# Patient Record
Sex: FEMALE | Race: WHITE | NOT HISPANIC OR LATINO | Employment: OTHER | URBAN - METROPOLITAN AREA
[De-identification: names, ages, dates, MRNs, and addresses within clinical notes are randomized per-mention and may not be internally consistent; named-entity substitution may affect disease eponyms.]

---

## 2018-05-31 ENCOUNTER — APPOINTMENT (OUTPATIENT)
Dept: CT IMAGING | Facility: HOSPITAL | Age: 63
End: 2018-05-31

## 2018-05-31 ENCOUNTER — APPOINTMENT (OUTPATIENT)
Dept: MRI IMAGING | Facility: HOSPITAL | Age: 63
End: 2018-05-31

## 2018-05-31 ENCOUNTER — HOSPITAL ENCOUNTER (EMERGENCY)
Facility: HOSPITAL | Age: 63
Discharge: HOME | End: 2018-05-31
Attending: EMERGENCY MEDICINE | Admitting: EMERGENCY MEDICINE

## 2018-05-31 VITALS
BODY MASS INDEX: 31.52 KG/M2 | DIASTOLIC BLOOD PRESSURE: 94 MMHG | RESPIRATION RATE: 16 BRPM | OXYGEN SATURATION: 99 % | SYSTOLIC BLOOD PRESSURE: 176 MMHG | HEIGHT: 63 IN | WEIGHT: 177.91 LBS | HEART RATE: 60 BPM | TEMPERATURE: 98.6 F

## 2018-05-31 DIAGNOSIS — R29.810 FACIAL WEAKNESS: Primary | ICD-10-CM

## 2018-05-31 LAB
ALBUMIN SERPL-MCNC: 4.9 G/DL (ref 3.5–5.2)
ALBUMIN/GLOB SERPL: 1.5 G/DL
ALP SERPL-CCNC: 71 U/L (ref 39–117)
ALT SERPL W P-5'-P-CCNC: 14 U/L (ref 1–33)
ANION GAP SERPL CALCULATED.3IONS-SCNC: 14.4 MMOL/L
AST SERPL-CCNC: 14 U/L (ref 1–32)
BASOPHILS # BLD AUTO: 0.05 10*3/MM3 (ref 0–0.2)
BASOPHILS NFR BLD AUTO: 0.8 % (ref 0–1.5)
BILIRUB SERPL-MCNC: 0.3 MG/DL (ref 0.1–1.2)
BUN BLD-MCNC: 9 MG/DL (ref 8–23)
BUN/CREAT SERPL: 11.8 (ref 7–25)
CALCIUM SPEC-SCNC: 9.8 MG/DL (ref 8.6–10.5)
CHLORIDE SERPL-SCNC: 103 MMOL/L (ref 98–107)
CO2 SERPL-SCNC: 25.6 MMOL/L (ref 22–29)
CREAT BLD-MCNC: 0.76 MG/DL (ref 0.57–1)
DEPRECATED RDW RBC AUTO: 41.7 FL (ref 37–54)
EOSINOPHIL # BLD AUTO: 0.05 10*3/MM3 (ref 0–0.7)
EOSINOPHIL NFR BLD AUTO: 0.8 % (ref 0.3–6.2)
ERYTHROCYTE [DISTWIDTH] IN BLOOD BY AUTOMATED COUNT: 13.7 % (ref 11.7–13)
GFR SERPL CREATININE-BSD FRML MDRD: 78 ML/MIN/1.73
GLOBULIN UR ELPH-MCNC: 3.2 GM/DL
GLUCOSE BLD-MCNC: 93 MG/DL (ref 65–99)
HCT VFR BLD AUTO: 36.6 % (ref 35.6–45.5)
HGB BLD-MCNC: 11.4 G/DL (ref 11.9–15.5)
IMM GRANULOCYTES # BLD: 0.01 10*3/MM3 (ref 0–0.03)
IMM GRANULOCYTES NFR BLD: 0.2 % (ref 0–0.5)
INR PPP: 1.07 (ref 0.9–1.1)
LYMPHOCYTES # BLD AUTO: 1.73 10*3/MM3 (ref 0.9–4.8)
LYMPHOCYTES NFR BLD AUTO: 27.3 % (ref 19.6–45.3)
MCH RBC QN AUTO: 25.7 PG (ref 26.9–32)
MCHC RBC AUTO-ENTMCNC: 31.1 G/DL (ref 32.4–36.3)
MCV RBC AUTO: 82.6 FL (ref 80.5–98.2)
MONOCYTES # BLD AUTO: 0.41 10*3/MM3 (ref 0.2–1.2)
MONOCYTES NFR BLD AUTO: 6.5 % (ref 5–12)
NEUTROPHILS # BLD AUTO: 4.09 10*3/MM3 (ref 1.9–8.1)
NEUTROPHILS NFR BLD AUTO: 64.6 % (ref 42.7–76)
PLATELET # BLD AUTO: 406 10*3/MM3 (ref 140–500)
PMV BLD AUTO: 10.1 FL (ref 6–12)
POTASSIUM BLD-SCNC: 4.6 MMOL/L (ref 3.5–5.2)
PROT SERPL-MCNC: 8.1 G/DL (ref 6–8.5)
PROTHROMBIN TIME: 13.7 SECONDS (ref 11.7–14.2)
RBC # BLD AUTO: 4.43 10*6/MM3 (ref 3.9–5.2)
SODIUM BLD-SCNC: 143 MMOL/L (ref 136–145)
WBC NRBC COR # BLD: 6.33 10*3/MM3 (ref 4.5–10.7)

## 2018-05-31 PROCEDURE — 70553 MRI BRAIN STEM W/O & W/DYE: CPT

## 2018-05-31 PROCEDURE — A9577 INJ MULTIHANCE: HCPCS | Performed by: EMERGENCY MEDICINE

## 2018-05-31 PROCEDURE — 85025 COMPLETE CBC W/AUTO DIFF WBC: CPT | Performed by: EMERGENCY MEDICINE

## 2018-05-31 PROCEDURE — 85610 PROTHROMBIN TIME: CPT | Performed by: EMERGENCY MEDICINE

## 2018-05-31 PROCEDURE — 0 GADOBENATE DIMEGLUMINE 529 MG/ML SOLUTION: Performed by: EMERGENCY MEDICINE

## 2018-05-31 PROCEDURE — 80053 COMPREHEN METABOLIC PANEL: CPT | Performed by: EMERGENCY MEDICINE

## 2018-05-31 PROCEDURE — 70450 CT HEAD/BRAIN W/O DYE: CPT

## 2018-05-31 PROCEDURE — 93005 ELECTROCARDIOGRAM TRACING: CPT | Performed by: EMERGENCY MEDICINE

## 2018-05-31 PROCEDURE — 99285 EMERGENCY DEPT VISIT HI MDM: CPT

## 2018-05-31 PROCEDURE — 93010 ELECTROCARDIOGRAM REPORT: CPT | Performed by: INTERNAL MEDICINE

## 2018-05-31 RX ORDER — ACETAMINOPHEN 500 MG
1000 TABLET ORAL EVERY 6 HOURS PRN
COMMUNITY

## 2018-05-31 RX ORDER — SIMVASTATIN 20 MG
20 TABLET ORAL DAILY
COMMUNITY

## 2018-05-31 RX ORDER — SODIUM CHLORIDE 0.9 % (FLUSH) 0.9 %
10 SYRINGE (ML) INJECTION AS NEEDED
Status: DISCONTINUED | OUTPATIENT
Start: 2018-05-31 | End: 2018-05-31 | Stop reason: HOSPADM

## 2018-05-31 RX ORDER — ASPIRIN 81 MG/1
81 TABLET ORAL DAILY
COMMUNITY

## 2018-05-31 RX ORDER — LISINOPRIL 20 MG/1
20 TABLET ORAL DAILY
COMMUNITY
End: 2019-03-13 | Stop reason: HOSPADM

## 2018-05-31 RX ORDER — LABETALOL 300 MG/1
300 TABLET, FILM COATED ORAL 2 TIMES DAILY
COMMUNITY

## 2018-05-31 RX ADMIN — GADOBENATE DIMEGLUMINE 16 ML: 529 INJECTION, SOLUTION INTRAVENOUS at 18:42

## 2018-06-01 ENCOUNTER — TELEPHONE (OUTPATIENT)
Dept: NEUROSURGERY | Facility: CLINIC | Age: 63
End: 2018-06-01

## 2018-06-01 NOTE — TELEPHONE ENCOUNTER
Celestino called to schedule an appointment due to er recommend them call & schedule an appointment.  Let the patient know that we will have Dr Georges look at the er note and mri and will call back to let them know if we will be scheduling or not.

## 2018-06-01 NOTE — TELEPHONE ENCOUNTER
We did not consult on this patient but the discharge summary states that she needs to follow up with you asap. Does this patient need to be worked in?

## 2018-06-02 NOTE — TELEPHONE ENCOUNTER
She needs to be seen by the neurosurgeon who clipped and treated her aneurysm. It was not me. May have been Hodes or Ab. See if you can find out and direct the patient to that doctor.

## 2018-06-04 NOTE — TELEPHONE ENCOUNTER
Spoke with the patient to let her know that per Dr. Georges she needs to see the neurosurgeon that previously treated her aneurysm.

## 2019-03-01 ENCOUNTER — APPOINTMENT (OUTPATIENT)
Dept: GENERAL RADIOLOGY | Facility: HOSPITAL | Age: 64
End: 2019-03-01

## 2019-03-01 ENCOUNTER — HOSPITAL ENCOUNTER (EMERGENCY)
Facility: HOSPITAL | Age: 64
Discharge: HOME | End: 2019-03-01
Attending: EMERGENCY MEDICINE | Admitting: EMERGENCY MEDICINE

## 2019-03-01 VITALS
HEIGHT: 63 IN | RESPIRATION RATE: 16 BRPM | WEIGHT: 185 LBS | BODY MASS INDEX: 32.78 KG/M2 | HEART RATE: 89 BPM | OXYGEN SATURATION: 98 % | TEMPERATURE: 98.2 F | DIASTOLIC BLOOD PRESSURE: 72 MMHG | SYSTOLIC BLOOD PRESSURE: 113 MMHG

## 2019-03-01 DIAGNOSIS — S43.402A SPRAIN OF LEFT SHOULDER, UNSPECIFIED SHOULDER SPRAIN TYPE, INITIAL ENCOUNTER: Primary | ICD-10-CM

## 2019-03-01 PROCEDURE — 73030 X-RAY EXAM OF SHOULDER: CPT

## 2019-03-01 PROCEDURE — 99283 EMERGENCY DEPT VISIT LOW MDM: CPT

## 2019-03-01 RX ORDER — NAPROXEN 500 MG/1
500 TABLET ORAL 2 TIMES DAILY WITH MEALS
Qty: 30 TABLET | Refills: 0 | Status: SHIPPED | OUTPATIENT
Start: 2019-03-01 | End: 2019-03-11

## 2019-03-01 RX ORDER — IBUPROFEN 800 MG/1
800 TABLET ORAL ONCE
Status: COMPLETED | OUTPATIENT
Start: 2019-03-01 | End: 2019-03-01

## 2019-03-01 RX ADMIN — IBUPROFEN 800 MG: 800 TABLET, FILM COATED ORAL at 13:22

## 2019-03-01 NOTE — ED NOTES
Pt reports she helped a friend move some boxes x3-4 days ago, c/o left shoulder and arm pain.      Skyla Payan, RN  03/01/19 0196

## 2019-03-01 NOTE — ED PROVIDER NOTES
EMERGENCY DEPARTMENT ENCOUNTER    CHIEF COMPLAINT  Chief Complaint: shoulder pain  History given by: patient  History limited by: nothing  Room Number: 01/01  PMD: Dorie Metz MD      HPI:  Pt is a 63 y.o. female who presents complaining of left shoulder pain that began four days ago. Her pain is worse when she moves it and is alleviated with positional rest. Pt denies injury to the area but she did help a friend move about one week ago and was lifting several boxes. She denies fever, CP, and SOA. She has no other complaints at this time.     Duration/Onset/Timing: four days, gradual, constant  Location: left shoulder  Radiation: none  Quality: pain  Intensity/Severity: moderate   Associated Symptoms: none  Aggravating or Alleviating Factors: movement and positional rest  Previous Episodes: none      PAST MEDICAL HISTORY  Active Ambulatory Problems     Diagnosis Date Noted   • No Active Ambulatory Problems     Resolved Ambulatory Problems     Diagnosis Date Noted   • No Resolved Ambulatory Problems     Past Medical History:   Diagnosis Date   • Aneurysm (CMS/HCC)    • Hyperlipidemia    • Hypertension        PAST SURGICAL HISTORY  Past Surgical History:   Procedure Laterality Date   • IR CEREBRAL ANEURYSM COILING         FAMILY HISTORY  No family history on file.    SOCIAL HISTORY  Social History     Socioeconomic History   • Marital status: Legally      Spouse name: Not on file   • Number of children: Not on file   • Years of education: Not on file   • Highest education level: Not on file   Social Needs   • Financial resource strain: Not on file   • Food insecurity - worry: Not on file   • Food insecurity - inability: Not on file   • Transportation needs - medical: Not on file   • Transportation needs - non-medical: Not on file   Occupational History   • Not on file   Tobacco Use   • Smoking status: Former Smoker   Substance and Sexual Activity   • Alcohol use: No   • Drug use: No   • Sexual activity:  Defer   Other Topics Concern   • Not on file   Social History Narrative   • Not on file       ALLERGIES  Sulfa antibiotics    REVIEW OF SYSTEMS  Review of Systems   Constitutional: Negative for fever.   Eyes: Positive for redness.   Respiratory: Negative for shortness of breath.    Cardiovascular: Negative for chest pain.   Musculoskeletal: Positive for arthralgias (left shoulder).   All other systems reviewed and are negative.      PHYSICAL EXAM  ED Triage Vitals [03/01/19 1231]   Temp Heart Rate Resp BP SpO2   98.2 °F (36.8 °C) 89 16 -- 98 %      Temp src Heart Rate Source Patient Position BP Location FiO2 (%)   Tympanic -- -- -- --       Physical Exam   Constitutional: No distress.   HENT:   Head: Normocephalic and atraumatic.   Cardiovascular: Regular rhythm.   Pulmonary/Chest: No respiratory distress.   Abdominal: There is no tenderness.   Musculoskeletal: She exhibits no tenderness.   C-spine unremarkable. Tenderness to palpation over left posterior shoulder. Pt has good range of motion of her left shoulder.   Skin: No rash noted.   Nursing note and vitals reviewed.        RADIOLOGY  XR Shoulder 2+ View Left   Final Result       No acute fracture is identified. Degenerative changes. If further   imaging evaluation is indicated, MRI could be considered.               This report was finalized on 3/1/2019 2:06 PM by Dr. Siva Israel M.D.               I ordered the above noted radiological studies. Interpreted by radiologist. Reviewed by me in PACS.       PROCEDURES  Procedures      PROGRESS AND CONSULTS       1303  Ordered XR L shoulder for further evaluation.     1320  Ordered ibuprofen for pain.    1412  Rechecked Pt who is resting comfortably. Informed her that her XR left shoulder shows nothing acute. Discussed plans to discharge Pt with naproxen for pain and a sling for comfort. Pt understands and agrees to all plans. All questions answered.     MEDICAL DECISION MAKING  Results were  reviewed/discussed with the patient and they were also made aware of online access. Pt also made aware that some labs, such as cultures, will not be resulted during ER visit and follow up with PMD is necessary.     MDM  Number of Diagnoses or Management Options     Amount and/or Complexity of Data Reviewed  Tests in the radiology section of CPT®: reviewed and ordered (XR left shoulder shows nothing acute.)           DIAGNOSIS  Final diagnoses:   Sprain of left shoulder, unspecified shoulder sprain type, initial encounter       DISPOSITION  DISCHARGE    Patient discharged in stable condition.    Reviewed implications of results, diagnosis, meds, responsibility to follow up, warning signs and symptoms of possible worsening, potential complications and reasons to return to ER, including new or worsening symptoms.    Patient/Family voiced understanding of above instructions.    Discussed plan for discharge, as there is no emergent indication for admission. Patient referred to primary care provider for BP management due to today's BP. Pt/family is agreeable and understands need for follow up and repeat testing.  Pt is aware that discharge does not mean that nothing is wrong but it indicates no emergency is present that requires admission and they must continue care with follow-up as given below or physician of their choice.     FOLLOW-UP  Jb Graham MD  [address] [facility] [address]  [place] [address]  [phone]    In 1 week  Call for Appointment         Medication List      New Prescriptions    naproxen 500 MG tablet  Commonly known as:  NAPROSYN  Take 1 tablet by mouth 2 (Two) Times a Day With Meals.              Latest Documented Vital Signs:  As of 2:15 PM  BP- 113/72 HR- 89 Temp- 98.2 °F (36.8 °C) (Tympanic) O2 sat- 98%    --  Documentation assistance provided by akash Salinas for Dr. Reynolds.  Information recorded by the akash was done at my direction and has been verified and validated by  me.         Dorie Salinas  03/01/19 1411       Tariq Reynolds MD  03/01/19 1419

## 2019-03-11 ENCOUNTER — HOSPITAL ENCOUNTER (OUTPATIENT)
Facility: HOSPITAL | Age: 64
Discharge: HOME | End: 2019-03-13
Attending: EMERGENCY MEDICINE | Admitting: INTERNAL MEDICINE

## 2019-03-11 ENCOUNTER — APPOINTMENT (OUTPATIENT)
Dept: GENERAL RADIOLOGY | Facility: HOSPITAL | Age: 64
End: 2019-03-11

## 2019-03-11 ENCOUNTER — APPOINTMENT (OUTPATIENT)
Dept: CT IMAGING | Facility: HOSPITAL | Age: 64
End: 2019-03-11

## 2019-03-11 DIAGNOSIS — M25.512 ACUTE PAIN OF LEFT SHOULDER: ICD-10-CM

## 2019-03-11 DIAGNOSIS — R55 SYNCOPE AND COLLAPSE: Primary | ICD-10-CM

## 2019-03-11 DIAGNOSIS — D50.9 IRON DEFICIENCY ANEMIA, UNSPECIFIED IRON DEFICIENCY ANEMIA TYPE: ICD-10-CM

## 2019-03-11 DIAGNOSIS — D72.829 LEUKOCYTOSIS, UNSPECIFIED TYPE: ICD-10-CM

## 2019-03-11 PROBLEM — I72.9 ANEURYSM (HCC): Status: ACTIVE | Noted: 2019-03-11

## 2019-03-11 PROBLEM — I10 ESSENTIAL HYPERTENSION: Status: ACTIVE | Noted: 2019-03-11

## 2019-03-11 LAB
ALBUMIN SERPL-MCNC: 4.8 G/DL (ref 3.5–5.2)
ALBUMIN/GLOB SERPL: 1.4 G/DL
ALP SERPL-CCNC: 58 U/L (ref 39–117)
ALT SERPL W P-5'-P-CCNC: 18 U/L (ref 1–33)
ANION GAP SERPL CALCULATED.3IONS-SCNC: 14.7 MMOL/L
AST SERPL-CCNC: 10 U/L (ref 1–32)
BACTERIA UR QL AUTO: ABNORMAL /HPF
BASOPHILS # BLD AUTO: 0.03 10*3/MM3 (ref 0–0.2)
BASOPHILS NFR BLD AUTO: 0.2 % (ref 0–1.5)
BILIRUB SERPL-MCNC: 0.3 MG/DL (ref 0.1–1.2)
BILIRUB UR QL STRIP: NEGATIVE
BUN BLD-MCNC: 21 MG/DL (ref 8–23)
BUN/CREAT SERPL: 16.4 (ref 7–25)
CALCIUM SPEC-SCNC: 10 MG/DL (ref 8.6–10.5)
CHLORIDE SERPL-SCNC: 99 MMOL/L (ref 98–107)
CLARITY UR: ABNORMAL
CO2 SERPL-SCNC: 23.3 MMOL/L (ref 22–29)
COD CRY URNS QL: ABNORMAL /HPF
COLOR UR: ABNORMAL
CREAT BLD-MCNC: 1.28 MG/DL (ref 0.57–1)
CRP SERPL-MCNC: 1.41 MG/DL (ref 0–0.5)
D-LACTATE SERPL-SCNC: 1.3 MMOL/L (ref 0.5–2)
DEPRECATED RDW RBC AUTO: 44.7 FL (ref 37–54)
EOSINOPHIL # BLD AUTO: 0 10*3/MM3 (ref 0–0.4)
EOSINOPHIL NFR BLD AUTO: 0 % (ref 0.3–6.2)
ERYTHROCYTE [DISTWIDTH] IN BLOOD BY AUTOMATED COUNT: 15.9 % (ref 12.3–15.4)
ERYTHROCYTE [SEDIMENTATION RATE] IN BLOOD: 62 MM/HR (ref 0–30)
GFR SERPL CREATININE-BSD FRML MDRD: 42 ML/MIN/1.73
GLOBULIN UR ELPH-MCNC: 3.5 GM/DL
GLUCOSE BLD-MCNC: 108 MG/DL (ref 65–99)
GLUCOSE UR STRIP-MCNC: NEGATIVE MG/DL
HCT VFR BLD AUTO: 33.1 % (ref 34–46.6)
HGB BLD-MCNC: 9.7 G/DL (ref 12–15.9)
HGB UR QL STRIP.AUTO: NEGATIVE
HOLD SPECIMEN: NORMAL
HOLD SPECIMEN: NORMAL
HYALINE CASTS UR QL AUTO: ABNORMAL /LPF
IMM GRANULOCYTES # BLD AUTO: 0.15 10*3/MM3 (ref 0–0.05)
IMM GRANULOCYTES NFR BLD AUTO: 0.8 % (ref 0–0.5)
KETONES UR QL STRIP: ABNORMAL
LEUKOCYTE ESTERASE UR QL STRIP.AUTO: ABNORMAL
LYMPHOCYTES # BLD AUTO: 1.7 10*3/MM3 (ref 0.7–3.1)
LYMPHOCYTES NFR BLD AUTO: 8.9 % (ref 19.6–45.3)
MAGNESIUM SERPL-MCNC: 2.4 MG/DL (ref 1.6–2.4)
MCH RBC QN AUTO: 23 PG (ref 26.6–33)
MCHC RBC AUTO-ENTMCNC: 29.3 G/DL (ref 31.5–35.7)
MCV RBC AUTO: 78.4 FL (ref 79–97)
MONOCYTES # BLD AUTO: 1.63 10*3/MM3 (ref 0.1–0.9)
MONOCYTES NFR BLD AUTO: 8.5 % (ref 5–12)
NEUTROPHILS # BLD AUTO: 15.68 10*3/MM3 (ref 1.4–7)
NEUTROPHILS NFR BLD AUTO: 81.6 % (ref 42.7–76)
NITRITE UR QL STRIP: NEGATIVE
NRBC BLD AUTO-RTO: 0 /100 WBC (ref 0–0)
PH UR STRIP.AUTO: <=5 [PH] (ref 5–8)
PLATELET # BLD AUTO: 494 10*3/MM3 (ref 140–450)
PMV BLD AUTO: 9.5 FL (ref 6–12)
POTASSIUM BLD-SCNC: 4.7 MMOL/L (ref 3.5–5.2)
PROCALCITONIN SERPL-MCNC: 0.06 NG/ML (ref 0.1–0.25)
PROT SERPL-MCNC: 8.3 G/DL (ref 6–8.5)
PROT UR QL STRIP: ABNORMAL
RBC # BLD AUTO: 4.22 10*6/MM3 (ref 3.77–5.28)
RBC # UR: ABNORMAL /HPF
REF LAB TEST METHOD: ABNORMAL
SODIUM BLD-SCNC: 137 MMOL/L (ref 136–145)
SP GR UR STRIP: 1.02 (ref 1–1.03)
SQUAMOUS #/AREA URNS HPF: ABNORMAL /HPF
TROPONIN T SERPL-MCNC: <0.01 NG/ML (ref 0–0.03)
UROBILINOGEN UR QL STRIP: ABNORMAL
WBC NRBC COR # BLD: 19.19 10*3/MM3 (ref 3.4–10.8)
WBC UR QL AUTO: ABNORMAL /HPF
WHOLE BLOOD HOLD SPECIMEN: NORMAL
WHOLE BLOOD HOLD SPECIMEN: NORMAL

## 2019-03-11 PROCEDURE — 83605 ASSAY OF LACTIC ACID: CPT | Performed by: NURSE PRACTITIONER

## 2019-03-11 PROCEDURE — 70450 CT HEAD/BRAIN W/O DYE: CPT

## 2019-03-11 PROCEDURE — G0378 HOSPITAL OBSERVATION PER HR: HCPCS

## 2019-03-11 PROCEDURE — 87040 BLOOD CULTURE FOR BACTERIA: CPT | Performed by: NURSE PRACTITIONER

## 2019-03-11 PROCEDURE — 73030 X-RAY EXAM OF SHOULDER: CPT

## 2019-03-11 PROCEDURE — 87086 URINE CULTURE/COLONY COUNT: CPT | Performed by: NURSE PRACTITIONER

## 2019-03-11 PROCEDURE — 85025 COMPLETE CBC W/AUTO DIFF WBC: CPT

## 2019-03-11 PROCEDURE — 86140 C-REACTIVE PROTEIN: CPT | Performed by: NURSE PRACTITIONER

## 2019-03-11 PROCEDURE — 84484 ASSAY OF TROPONIN QUANT: CPT

## 2019-03-11 PROCEDURE — 25010000002 MORPHINE PER 10 MG: Performed by: NURSE PRACTITIONER

## 2019-03-11 PROCEDURE — 96361 HYDRATE IV INFUSION ADD-ON: CPT

## 2019-03-11 PROCEDURE — 85652 RBC SED RATE AUTOMATED: CPT | Performed by: NURSE PRACTITIONER

## 2019-03-11 PROCEDURE — 84145 PROCALCITONIN (PCT): CPT | Performed by: NURSE PRACTITIONER

## 2019-03-11 PROCEDURE — 93005 ELECTROCARDIOGRAM TRACING: CPT | Performed by: EMERGENCY MEDICINE

## 2019-03-11 PROCEDURE — 93005 ELECTROCARDIOGRAM TRACING: CPT

## 2019-03-11 PROCEDURE — 96375 TX/PRO/DX INJ NEW DRUG ADDON: CPT

## 2019-03-11 PROCEDURE — 87076 CULTURE ANAEROBE IDENT EACH: CPT | Performed by: NURSE PRACTITIONER

## 2019-03-11 PROCEDURE — 83735 ASSAY OF MAGNESIUM: CPT

## 2019-03-11 PROCEDURE — 93010 ELECTROCARDIOGRAM REPORT: CPT | Performed by: INTERNAL MEDICINE

## 2019-03-11 PROCEDURE — 71046 X-RAY EXAM CHEST 2 VIEWS: CPT

## 2019-03-11 PROCEDURE — 96374 THER/PROPH/DIAG INJ IV PUSH: CPT

## 2019-03-11 PROCEDURE — 25010000002 ONDANSETRON PER 1 MG: Performed by: NURSE PRACTITIONER

## 2019-03-11 PROCEDURE — 99285 EMERGENCY DEPT VISIT HI MDM: CPT

## 2019-03-11 PROCEDURE — 81001 URINALYSIS AUTO W/SCOPE: CPT | Performed by: NURSE PRACTITIONER

## 2019-03-11 PROCEDURE — 80053 COMPREHEN METABOLIC PANEL: CPT

## 2019-03-11 PROCEDURE — 87077 CULTURE AEROBIC IDENTIFY: CPT | Performed by: NURSE PRACTITIONER

## 2019-03-11 PROCEDURE — 87186 SC STD MICRODIL/AGAR DIL: CPT | Performed by: NURSE PRACTITIONER

## 2019-03-11 RX ORDER — ONDANSETRON 4 MG/1
4 TABLET, FILM COATED ORAL EVERY 6 HOURS PRN
Status: DISCONTINUED | OUTPATIENT
Start: 2019-03-11 | End: 2019-03-13 | Stop reason: HOSPADM

## 2019-03-11 RX ORDER — ONDANSETRON 4 MG/1
4 TABLET, ORALLY DISINTEGRATING ORAL EVERY 6 HOURS PRN
Status: DISCONTINUED | OUTPATIENT
Start: 2019-03-11 | End: 2019-03-13 | Stop reason: HOSPADM

## 2019-03-11 RX ORDER — ASPIRIN 81 MG/1
81 TABLET ORAL DAILY
Status: DISCONTINUED | OUTPATIENT
Start: 2019-03-12 | End: 2019-03-13 | Stop reason: HOSPADM

## 2019-03-11 RX ORDER — MORPHINE SULFATE 2 MG/ML
2 INJECTION, SOLUTION INTRAMUSCULAR; INTRAVENOUS ONCE
Status: COMPLETED | OUTPATIENT
Start: 2019-03-11 | End: 2019-03-11

## 2019-03-11 RX ORDER — ONDANSETRON 2 MG/ML
4 INJECTION INTRAMUSCULAR; INTRAVENOUS EVERY 6 HOURS PRN
Status: DISCONTINUED | OUTPATIENT
Start: 2019-03-11 | End: 2019-03-13 | Stop reason: HOSPADM

## 2019-03-11 RX ORDER — NITROGLYCERIN 0.4 MG/1
0.4 TABLET SUBLINGUAL
Status: DISCONTINUED | OUTPATIENT
Start: 2019-03-11 | End: 2019-03-12

## 2019-03-11 RX ORDER — NALOXONE HYDROCHLORIDE 1 MG/ML
0.4 INJECTION INTRAMUSCULAR; INTRAVENOUS; SUBCUTANEOUS AS NEEDED
Status: DISCONTINUED | OUTPATIENT
Start: 2019-03-11 | End: 2019-03-12

## 2019-03-11 RX ORDER — SODIUM CHLORIDE 0.9 % (FLUSH) 0.9 %
10 SYRINGE (ML) INJECTION AS NEEDED
Status: DISCONTINUED | OUTPATIENT
Start: 2019-03-11 | End: 2019-03-12

## 2019-03-11 RX ORDER — SODIUM CHLORIDE 0.9 % (FLUSH) 0.9 %
3 SYRINGE (ML) INJECTION EVERY 12 HOURS SCHEDULED
Status: DISCONTINUED | OUTPATIENT
Start: 2019-03-11 | End: 2019-03-13 | Stop reason: HOSPADM

## 2019-03-11 RX ORDER — ONDANSETRON 2 MG/ML
4 INJECTION INTRAMUSCULAR; INTRAVENOUS ONCE
Status: COMPLETED | OUTPATIENT
Start: 2019-03-11 | End: 2019-03-11

## 2019-03-11 RX ORDER — ATORVASTATIN CALCIUM 10 MG/1
10 TABLET, FILM COATED ORAL DAILY
Status: DISCONTINUED | OUTPATIENT
Start: 2019-03-12 | End: 2019-03-13 | Stop reason: HOSPADM

## 2019-03-11 RX ORDER — ACETAMINOPHEN 325 MG/1
650 TABLET ORAL EVERY 4 HOURS PRN
Status: DISCONTINUED | OUTPATIENT
Start: 2019-03-11 | End: 2019-03-13 | Stop reason: HOSPADM

## 2019-03-11 RX ORDER — SODIUM CHLORIDE 0.9 % (FLUSH) 0.9 %
3-10 SYRINGE (ML) INJECTION AS NEEDED
Status: DISCONTINUED | OUTPATIENT
Start: 2019-03-11 | End: 2019-03-13 | Stop reason: HOSPADM

## 2019-03-11 RX ORDER — SENNA AND DOCUSATE SODIUM 50; 8.6 MG/1; MG/1
2 TABLET, FILM COATED ORAL 2 TIMES DAILY PRN
Status: DISCONTINUED | OUTPATIENT
Start: 2019-03-11 | End: 2019-03-13 | Stop reason: HOSPADM

## 2019-03-11 RX ORDER — HYDROCODONE BITARTRATE AND ACETAMINOPHEN 5; 325 MG/1; MG/1
1 TABLET ORAL EVERY 4 HOURS PRN
Status: DISCONTINUED | OUTPATIENT
Start: 2019-03-11 | End: 2019-03-13 | Stop reason: HOSPADM

## 2019-03-11 RX ORDER — SODIUM CHLORIDE 9 MG/ML
100 INJECTION, SOLUTION INTRAVENOUS CONTINUOUS
Status: DISCONTINUED | OUTPATIENT
Start: 2019-03-11 | End: 2019-03-12

## 2019-03-11 RX ORDER — HYDROMORPHONE HYDROCHLORIDE 1 MG/ML
0.5 INJECTION, SOLUTION INTRAMUSCULAR; INTRAVENOUS; SUBCUTANEOUS
Status: DISCONTINUED | OUTPATIENT
Start: 2019-03-11 | End: 2019-03-12

## 2019-03-11 RX ADMIN — MORPHINE SULFATE 2 MG: 2 INJECTION, SOLUTION INTRAMUSCULAR; INTRAVENOUS at 20:36

## 2019-03-11 RX ADMIN — HYDROCODONE BITARTRATE AND ACETAMINOPHEN 1 TABLET: 5; 325 TABLET ORAL at 22:21

## 2019-03-11 RX ADMIN — SODIUM CHLORIDE 100 ML/HR: 9 INJECTION, SOLUTION INTRAVENOUS at 22:21

## 2019-03-11 RX ADMIN — SODIUM CHLORIDE 1000 ML: 9 INJECTION, SOLUTION INTRAVENOUS at 19:13

## 2019-03-11 RX ADMIN — ONDANSETRON HYDROCHLORIDE 4 MG: 2 SOLUTION INTRAMUSCULAR; INTRAVENOUS at 20:30

## 2019-03-12 ENCOUNTER — APPOINTMENT (OUTPATIENT)
Dept: MRI IMAGING | Facility: HOSPITAL | Age: 64
End: 2019-03-12

## 2019-03-12 PROBLEM — N17.9 ACUTE KIDNEY INJURY (HCC): Status: ACTIVE | Noted: 2019-03-12

## 2019-03-12 PROBLEM — D50.9 IRON DEFICIENCY ANEMIA: Status: ACTIVE | Noted: 2019-03-12

## 2019-03-12 LAB
ALBUMIN SERPL-MCNC: 4.1 G/DL (ref 3.5–5.2)
ALBUMIN/GLOB SERPL: 1.2 G/DL
ALP SERPL-CCNC: 53 U/L (ref 39–117)
ALT SERPL W P-5'-P-CCNC: 14 U/L (ref 1–33)
ANION GAP SERPL CALCULATED.3IONS-SCNC: 13.1 MMOL/L
AST SERPL-CCNC: 10 U/L (ref 1–32)
BASOPHILS # BLD AUTO: 0.03 10*3/MM3 (ref 0–0.2)
BASOPHILS NFR BLD AUTO: 0.2 % (ref 0–1.5)
BILIRUB SERPL-MCNC: 0.2 MG/DL (ref 0.1–1.2)
BUN BLD-MCNC: 18 MG/DL (ref 8–23)
BUN/CREAT SERPL: 22.5 (ref 7–25)
CALCIUM SPEC-SCNC: 9.9 MG/DL (ref 8.6–10.5)
CHLORIDE SERPL-SCNC: 105 MMOL/L (ref 98–107)
CO2 SERPL-SCNC: 23.9 MMOL/L (ref 22–29)
CREAT BLD-MCNC: 0.8 MG/DL (ref 0.57–1)
DEPRECATED RDW RBC AUTO: 45.5 FL (ref 37–54)
EOSINOPHIL # BLD AUTO: 0.01 10*3/MM3 (ref 0–0.4)
EOSINOPHIL NFR BLD AUTO: 0.1 % (ref 0.3–6.2)
ERYTHROCYTE [DISTWIDTH] IN BLOOD BY AUTOMATED COUNT: 16 % (ref 12.3–15.4)
FERRITIN SERPL-MCNC: 10.4 NG/ML (ref 13–150)
FOLATE SERPL-MCNC: 13.4 NG/ML (ref 4.78–24.2)
GFR SERPL CREATININE-BSD FRML MDRD: 73 ML/MIN/1.73
GLOBULIN UR ELPH-MCNC: 3.3 GM/DL
GLUCOSE BLD-MCNC: 105 MG/DL (ref 65–99)
HCT VFR BLD AUTO: 29.2 % (ref 34–46.6)
HEMOCCULT STL QL: NEGATIVE
HGB BLD-MCNC: 8.4 G/DL (ref 12–15.9)
IMM GRANULOCYTES # BLD AUTO: 0.06 10*3/MM3 (ref 0–0.05)
IMM GRANULOCYTES NFR BLD AUTO: 0.4 % (ref 0–0.5)
IRON 24H UR-MRATE: 11 MCG/DL (ref 37–145)
IRON SATN MFR SERPL: 2 % (ref 20–50)
LYMPHOCYTES # BLD AUTO: 1.53 10*3/MM3 (ref 0.7–3.1)
LYMPHOCYTES NFR BLD AUTO: 11.4 % (ref 19.6–45.3)
MCH RBC QN AUTO: 22.5 PG (ref 26.6–33)
MCHC RBC AUTO-ENTMCNC: 28.8 G/DL (ref 31.5–35.7)
MCV RBC AUTO: 78.1 FL (ref 79–97)
MONOCYTES # BLD AUTO: 1.13 10*3/MM3 (ref 0.1–0.9)
MONOCYTES NFR BLD AUTO: 8.4 % (ref 5–12)
NEUTROPHILS # BLD AUTO: 10.71 10*3/MM3 (ref 1.4–7)
NEUTROPHILS NFR BLD AUTO: 79.5 % (ref 42.7–76)
NRBC BLD AUTO-RTO: 0 /100 WBC (ref 0–0)
PLATELET # BLD AUTO: 388 10*3/MM3 (ref 140–450)
PMV BLD AUTO: 9.8 FL (ref 6–12)
POTASSIUM BLD-SCNC: 5 MMOL/L (ref 3.5–5.2)
PROT SERPL-MCNC: 7.4 G/DL (ref 6–8.5)
RBC # BLD AUTO: 3.74 10*6/MM3 (ref 3.77–5.28)
SODIUM BLD-SCNC: 142 MMOL/L (ref 136–145)
TIBC SERPL-MCNC: 490 MCG/DL (ref 298–536)
TRANSFERRIN SERPL-MCNC: 329 MG/DL (ref 200–360)
VIT B12 BLD-MCNC: 548 PG/ML (ref 211–946)
WBC NRBC COR # BLD: 13.47 10*3/MM3 (ref 3.4–10.8)

## 2019-03-12 PROCEDURE — 83540 ASSAY OF IRON: CPT | Performed by: HOSPITALIST

## 2019-03-12 PROCEDURE — 82607 VITAMIN B-12: CPT | Performed by: HOSPITALIST

## 2019-03-12 PROCEDURE — 80053 COMPREHEN METABOLIC PANEL: CPT | Performed by: INTERNAL MEDICINE

## 2019-03-12 PROCEDURE — 97161 PT EVAL LOW COMPLEX 20 MIN: CPT

## 2019-03-12 PROCEDURE — 82728 ASSAY OF FERRITIN: CPT | Performed by: HOSPITALIST

## 2019-03-12 PROCEDURE — 99222 1ST HOSP IP/OBS MODERATE 55: CPT | Performed by: INTERNAL MEDICINE

## 2019-03-12 PROCEDURE — G0378 HOSPITAL OBSERVATION PER HR: HCPCS

## 2019-03-12 PROCEDURE — 97110 THERAPEUTIC EXERCISES: CPT

## 2019-03-12 PROCEDURE — 96361 HYDRATE IV INFUSION ADD-ON: CPT

## 2019-03-12 PROCEDURE — 36415 COLL VENOUS BLD VENIPUNCTURE: CPT | Performed by: INTERNAL MEDICINE

## 2019-03-12 PROCEDURE — 25010000002 IRON SUCROSE PER 1 MG: Performed by: HOSPITALIST

## 2019-03-12 PROCEDURE — 82272 OCCULT BLD FECES 1-3 TESTS: CPT | Performed by: HOSPITALIST

## 2019-03-12 PROCEDURE — 84466 ASSAY OF TRANSFERRIN: CPT | Performed by: HOSPITALIST

## 2019-03-12 PROCEDURE — 82746 ASSAY OF FOLIC ACID SERUM: CPT | Performed by: HOSPITALIST

## 2019-03-12 PROCEDURE — 85025 COMPLETE CBC W/AUTO DIFF WBC: CPT | Performed by: INTERNAL MEDICINE

## 2019-03-12 RX ADMIN — HYDROCODONE BITARTRATE AND ACETAMINOPHEN 1 TABLET: 5; 325 TABLET ORAL at 23:20

## 2019-03-12 RX ADMIN — ATORVASTATIN CALCIUM 10 MG: 10 TABLET, FILM COATED ORAL at 08:01

## 2019-03-12 RX ADMIN — HYDROCODONE BITARTRATE AND ACETAMINOPHEN 1 TABLET: 5; 325 TABLET ORAL at 08:00

## 2019-03-12 RX ADMIN — ASPIRIN 81 MG: 81 TABLET, DELAYED RELEASE ORAL at 08:01

## 2019-03-12 RX ADMIN — HYDROCODONE BITARTRATE AND ACETAMINOPHEN 1 TABLET: 5; 325 TABLET ORAL at 03:16

## 2019-03-12 RX ADMIN — IRON SUCROSE 200 MG: 20 INJECTION, SOLUTION INTRAVENOUS at 13:53

## 2019-03-12 RX ADMIN — Medication 3 ML: at 21:47

## 2019-03-12 RX ADMIN — HYDROCODONE BITARTRATE AND ACETAMINOPHEN 1 TABLET: 5; 325 TABLET ORAL at 14:09

## 2019-03-12 RX ADMIN — HYDROCODONE BITARTRATE AND ACETAMINOPHEN 1 TABLET: 5; 325 TABLET ORAL at 18:48

## 2019-03-12 RX ADMIN — Medication 3 ML: at 10:40

## 2019-03-13 ENCOUNTER — ANESTHESIA EVENT (OUTPATIENT)
Dept: GASTROENTEROLOGY | Facility: HOSPITAL | Age: 64
End: 2019-03-13

## 2019-03-13 ENCOUNTER — ANESTHESIA (OUTPATIENT)
Dept: GASTROENTEROLOGY | Facility: HOSPITAL | Age: 64
End: 2019-03-13

## 2019-03-13 VITALS
HEART RATE: 70 BPM | RESPIRATION RATE: 18 BRPM | BODY MASS INDEX: 33.48 KG/M2 | DIASTOLIC BLOOD PRESSURE: 74 MMHG | SYSTOLIC BLOOD PRESSURE: 126 MMHG | WEIGHT: 188.93 LBS | TEMPERATURE: 98.1 F | OXYGEN SATURATION: 97 % | HEIGHT: 63 IN

## 2019-03-13 PROBLEM — K29.70 GASTRITIS DETERMINED BY ENDOSCOPY: Status: ACTIVE | Noted: 2019-03-13

## 2019-03-13 PROBLEM — K29.80 DUODENITIS WITHOUT BLEEDING: Status: ACTIVE | Noted: 2019-03-13

## 2019-03-13 PROBLEM — K20.90 ESOPHAGITIS DETERMINED BY ENDOSCOPY: Status: ACTIVE | Noted: 2019-03-13

## 2019-03-13 LAB
BACTERIA SPEC AEROBE CULT: ABNORMAL
BACTERIA SPEC AEROBE CULT: ABNORMAL
DEPRECATED RDW RBC AUTO: 45.1 FL (ref 37–54)
ERYTHROCYTE [DISTWIDTH] IN BLOOD BY AUTOMATED COUNT: 15.9 % (ref 12.3–15.4)
HCT VFR BLD AUTO: 28.5 % (ref 34–46.6)
HGB BLD-MCNC: 8.2 G/DL (ref 12–15.9)
MCH RBC QN AUTO: 22.5 PG (ref 26.6–33)
MCHC RBC AUTO-ENTMCNC: 28.8 G/DL (ref 31.5–35.7)
MCV RBC AUTO: 78.1 FL (ref 79–97)
PLATELET # BLD AUTO: 372 10*3/MM3 (ref 140–450)
PMV BLD AUTO: 9.6 FL (ref 6–12)
RBC # BLD AUTO: 3.65 10*6/MM3 (ref 3.77–5.28)
WBC NRBC COR # BLD: 8.93 10*3/MM3 (ref 3.4–10.8)

## 2019-03-13 PROCEDURE — 43239 EGD BIOPSY SINGLE/MULTIPLE: CPT | Performed by: INTERNAL MEDICINE

## 2019-03-13 PROCEDURE — 0DB58ZX EXCISION OF ESOPHAGUS, VIA NATURAL OR ARTIFICIAL OPENING ENDOSCOPIC, DIAGNOSTIC: ICD-10-PCS | Performed by: INTERNAL MEDICINE

## 2019-03-13 PROCEDURE — G0378 HOSPITAL OBSERVATION PER HR: HCPCS

## 2019-03-13 PROCEDURE — A9270 NON-COVERED ITEM OR SERVICE: HCPCS | Performed by: INTERNAL MEDICINE

## 2019-03-13 PROCEDURE — 25010000002 IRON SUCROSE PER 1 MG: Performed by: HOSPITALIST

## 2019-03-13 PROCEDURE — 63710000001 ASPIRIN 81 MG TABLET DELAYED-RELEASE: Performed by: INTERNAL MEDICINE

## 2019-03-13 PROCEDURE — 25010000002 PROPOFOL 10 MG/ML EMULSION: Performed by: ANESTHESIOLOGY

## 2019-03-13 PROCEDURE — 0DB98ZX EXCISION OF DUODENUM, VIA NATURAL OR ARTIFICIAL OPENING ENDOSCOPIC, DIAGNOSTIC: ICD-10-PCS | Performed by: INTERNAL MEDICINE

## 2019-03-13 PROCEDURE — 0DB68ZX EXCISION OF STOMACH, VIA NATURAL OR ARTIFICIAL OPENING ENDOSCOPIC, DIAGNOSTIC: ICD-10-PCS | Performed by: INTERNAL MEDICINE

## 2019-03-13 PROCEDURE — 88305 TISSUE EXAM BY PATHOLOGIST: CPT | Performed by: INTERNAL MEDICINE

## 2019-03-13 PROCEDURE — 85027 COMPLETE CBC AUTOMATED: CPT | Performed by: HOSPITALIST

## 2019-03-13 PROCEDURE — 63710000001 HYDROCODONE-ACETAMINOPHEN 5-325 MG TABLET: Performed by: INTERNAL MEDICINE

## 2019-03-13 RX ORDER — PROPOFOL 10 MG/ML
VIAL (ML) INTRAVENOUS AS NEEDED
Status: DISCONTINUED | OUTPATIENT
Start: 2019-03-13 | End: 2019-03-13 | Stop reason: SURG

## 2019-03-13 RX ORDER — SODIUM CHLORIDE, SODIUM LACTATE, POTASSIUM CHLORIDE, CALCIUM CHLORIDE 600; 310; 30; 20 MG/100ML; MG/100ML; MG/100ML; MG/100ML
30 INJECTION, SOLUTION INTRAVENOUS CONTINUOUS
Status: DISCONTINUED | OUTPATIENT
Start: 2019-03-13 | End: 2019-03-13

## 2019-03-13 RX ORDER — PANTOPRAZOLE SODIUM 20 MG/1
20 TABLET, DELAYED RELEASE ORAL
Status: DISCONTINUED | OUTPATIENT
Start: 2019-03-13 | End: 2019-03-13 | Stop reason: CLARIF

## 2019-03-13 RX ORDER — PROPOFOL 10 MG/ML
VIAL (ML) INTRAVENOUS CONTINUOUS PRN
Status: DISCONTINUED | OUTPATIENT
Start: 2019-03-13 | End: 2019-03-13 | Stop reason: SURG

## 2019-03-13 RX ORDER — FERROUS SULFATE 325(65) MG
325 TABLET ORAL EVERY OTHER DAY
Qty: 15 TABLET | Refills: 0 | Status: SHIPPED | OUTPATIENT
Start: 2019-03-13

## 2019-03-13 RX ORDER — PANTOPRAZOLE SODIUM 40 MG/1
40 TABLET, DELAYED RELEASE ORAL
Status: DISCONTINUED | OUTPATIENT
Start: 2019-03-14 | End: 2019-03-13 | Stop reason: HOSPADM

## 2019-03-13 RX ORDER — PANTOPRAZOLE SODIUM 40 MG/1
40 TABLET, DELAYED RELEASE ORAL DAILY
Qty: 30 TABLET | Refills: 0 | Status: SHIPPED | OUTPATIENT
Start: 2019-03-13 | End: 2019-03-18 | Stop reason: SDUPTHER

## 2019-03-13 RX ADMIN — PROPOFOL 130 MG: 10 INJECTION, EMULSION INTRAVENOUS at 13:06

## 2019-03-13 RX ADMIN — PROPOFOL 180 MCG/KG/MIN: 10 INJECTION, EMULSION INTRAVENOUS at 13:06

## 2019-03-13 RX ADMIN — HYDROCODONE BITARTRATE AND ACETAMINOPHEN 1 TABLET: 5; 325 TABLET ORAL at 14:50

## 2019-03-13 RX ADMIN — HYDROCODONE BITARTRATE AND ACETAMINOPHEN 1 TABLET: 5; 325 TABLET ORAL at 19:20

## 2019-03-13 RX ADMIN — SODIUM CHLORIDE, POTASSIUM CHLORIDE, SODIUM LACTATE AND CALCIUM CHLORIDE: 600; 310; 30; 20 INJECTION, SOLUTION INTRAVENOUS at 12:59

## 2019-03-13 RX ADMIN — IRON SUCROSE 200 MG: 20 INJECTION, SOLUTION INTRAVENOUS at 14:11

## 2019-03-13 RX ADMIN — HYDROCODONE BITARTRATE AND ACETAMINOPHEN 1 TABLET: 5; 325 TABLET ORAL at 05:43

## 2019-03-13 RX ADMIN — ASPIRIN 81 MG: 81 TABLET, DELAYED RELEASE ORAL at 14:11

## 2019-03-13 RX ADMIN — ATORVASTATIN CALCIUM 10 MG: 10 TABLET, FILM COATED ORAL at 08:51

## 2019-03-13 NOTE — ANESTHESIA PREPROCEDURE EVALUATION
Anesthesia Evaluation     Patient summary reviewed and Nursing notes reviewed   NPO Solid Status: > 8 hours  NPO Liquid Status: > 2 hours           Airway   Mallampati: II  TM distance: >3 FB  Neck ROM: full  Dental - normal exam     Pulmonary - normal exam    breath sounds clear to auscultation  (+) a smoker Former,   Cardiovascular - normal exam  Exercise tolerance: good (4-7 METS)    Patient on routine beta blocker  Rhythm: regular  Rate: normal    (+) hypertension, hyperlipidemia,   (-) angina, orthopnea, PND, MORRIS      Neuro/Psych  (+) CVA, syncope (Vasovagal near syncope),     GI/Hepatic/Renal/Endo    (+)   renal disease ARF,     Musculoskeletal (-) negative ROS    Abdominal    Substance History - negative use     OB/GYN negative ob/gyn ROS         Other - negative ROS                     Anesthesia Plan    ASA 3     MAC     Anesthetic plan, all risks, benefits, and alternatives have been provided, discussed and informed consent has been obtained with: patient.

## 2019-03-13 NOTE — ANESTHESIA POSTPROCEDURE EVALUATION
"Patient: Joanne Louie    Procedure Summary     Date:  03/13/19 Room / Location:  [facility] ENDOSCOPY 10 /  [facility]    Anesthesia Start:  1302 Anesthesia Stop:  1331    Procedure:  ESOPHAGOGASTRODUODENOSCOPY WITH BIOPSY (N/A Esophagus) Diagnosis:       Iron deficiency anemia, unspecified iron deficiency anemia type      (Iron deficiency anemia, unspecified iron deficiency anemia type [D50.9])    Surgeon:  Joanne Ibrahim MD Provider:  Arina Grant MD    Anesthesia Type:  MAC ASA Status:  3          Anesthesia Type: MAC  Last vitals  BP   124/84 (03/13/19 1331)   Temp   36.7 °C (98.1 °F) (03/13/19 1148)   Pulse   69 (03/13/19 1331)   Resp   14 (03/13/19 1331)     SpO2   100 % (03/13/19 1331)     Post Anesthesia Care and Evaluation    Patient location during evaluation: PACU  Patient participation: complete - patient participated  Level of consciousness: sleepy but conscious  Pain score: 0  Pain management: adequate  Airway patency: patent  Anesthetic complications: No anesthetic complications    Cardiovascular status: acceptable  Respiratory status: acceptable  Hydration status: acceptable    Comments: Blood pressure 124/84, pulse 69, temperature 36.7 °C (98.1 °F), temperature source Oral, resp. rate 14, height 160 cm (63\"), weight 85.7 kg (188 lb 15 oz), SpO2 100 %, not currently breastfeeding.    No anesthesia care post op    "

## 2019-03-14 LAB
CYTO UR: NORMAL
LAB AP CASE REPORT: NORMAL
PATH REPORT.FINAL DX SPEC: NORMAL
PATH REPORT.GROSS SPEC: NORMAL

## 2019-03-15 ENCOUNTER — TELEPHONE (OUTPATIENT)
Dept: GASTROENTEROLOGY | Facility: CLINIC | Age: 64
End: 2019-03-15

## 2019-03-15 NOTE — TELEPHONE ENCOUNTER
----- Message from Joanne Ibrahim MD sent at 3/15/2019 10:40 AM EDT -----  Her EGD biopsies showed Hoyt's esophagus, esophagitis    She should continue pantoprazole.  Repeat EGD in 3 years    Hospital follow up with me in 6-8 weeks, thanks

## 2019-03-16 LAB
BACTERIA SPEC AEROBE CULT: NORMAL
BACTERIA SPEC AEROBE CULT: NORMAL

## 2019-03-18 RX ORDER — PANTOPRAZOLE SODIUM 40 MG/1
40 TABLET, DELAYED RELEASE ORAL DAILY
Qty: 30 TABLET | Refills: 5 | Status: SHIPPED | OUTPATIENT
Start: 2019-03-18

## 2019-03-18 NOTE — TELEPHONE ENCOUNTER
Called pt and advised per Dr Ibrahim that her egd bx showed arroyo's esophagus esophagitis.      She should continue pantoprazole.  Repeat egd in 3 yrs and f/u with her in 6-8 wks.      Pt verb understanding and states she will need refill of the pantoprazole.  Advised pt we will send this to her pharmacy.  Pt also made appt for 04/30 at 130p with Dr Ibrahim.

## 2019-04-15 ENCOUNTER — APPOINTMENT (OUTPATIENT)
Dept: PREADMISSION TESTING | Facility: HOSPITAL | Age: 64
End: 2019-04-15

## 2019-04-15 VITALS
BODY MASS INDEX: 33.31 KG/M2 | RESPIRATION RATE: 18 BRPM | SYSTOLIC BLOOD PRESSURE: 120 MMHG | DIASTOLIC BLOOD PRESSURE: 78 MMHG | TEMPERATURE: 98 F | HEART RATE: 51 BPM | HEIGHT: 63 IN | OXYGEN SATURATION: 99 % | WEIGHT: 188 LBS

## 2019-04-15 LAB
ALBUMIN SERPL-MCNC: 4.6 G/DL (ref 3.5–5.2)
ALBUMIN/GLOB SERPL: 1.8 G/DL
ALP SERPL-CCNC: 52 U/L (ref 39–117)
ALT SERPL W P-5'-P-CCNC: 15 U/L (ref 1–33)
ANION GAP SERPL CALCULATED.3IONS-SCNC: 12.4 MMOL/L
AST SERPL-CCNC: 14 U/L (ref 1–32)
BASOPHILS # BLD AUTO: 0.05 10*3/MM3 (ref 0–0.2)
BASOPHILS NFR BLD AUTO: 0.8 % (ref 0–1.5)
BILIRUB SERPL-MCNC: 0.3 MG/DL (ref 0.2–1.2)
BUN BLD-MCNC: 8 MG/DL (ref 8–23)
BUN/CREAT SERPL: 9.6 (ref 7–25)
CALCIUM SPEC-SCNC: 9.4 MG/DL (ref 8.6–10.5)
CHLORIDE SERPL-SCNC: 102 MMOL/L (ref 98–107)
CO2 SERPL-SCNC: 25.6 MMOL/L (ref 22–29)
CREAT BLD-MCNC: 0.83 MG/DL (ref 0.57–1)
DEPRECATED RDW RBC AUTO: 63.2 FL (ref 37–54)
EOSINOPHIL # BLD AUTO: 0.06 10*3/MM3 (ref 0–0.4)
EOSINOPHIL NFR BLD AUTO: 0.9 % (ref 0.3–6.2)
ERYTHROCYTE [DISTWIDTH] IN BLOOD BY AUTOMATED COUNT: 20.5 % (ref 12.3–15.4)
GFR SERPL CREATININE-BSD FRML MDRD: 70 ML/MIN/1.73
GLOBULIN UR ELPH-MCNC: 2.6 GM/DL
GLUCOSE BLD-MCNC: 128 MG/DL (ref 65–99)
HCT VFR BLD AUTO: 36.4 % (ref 34–46.6)
HGB BLD-MCNC: 10.8 G/DL (ref 12–15.9)
IMM GRANULOCYTES # BLD AUTO: 0.02 10*3/MM3 (ref 0–0.05)
IMM GRANULOCYTES NFR BLD AUTO: 0.3 % (ref 0–0.5)
LYMPHOCYTES # BLD AUTO: 1.57 10*3/MM3 (ref 0.7–3.1)
LYMPHOCYTES NFR BLD AUTO: 24.2 % (ref 19.6–45.3)
MCH RBC QN AUTO: 24.9 PG (ref 26.6–33)
MCHC RBC AUTO-ENTMCNC: 29.7 G/DL (ref 31.5–35.7)
MCV RBC AUTO: 84.1 FL (ref 79–97)
MONOCYTES # BLD AUTO: 0.49 10*3/MM3 (ref 0.1–0.9)
MONOCYTES NFR BLD AUTO: 7.6 % (ref 5–12)
NEUTROPHILS # BLD AUTO: 4.3 10*3/MM3 (ref 1.4–7)
NEUTROPHILS NFR BLD AUTO: 66.2 % (ref 42.7–76)
NRBC BLD AUTO-RTO: 0 /100 WBC (ref 0–0)
PLATELET # BLD AUTO: 325 10*3/MM3 (ref 140–450)
PMV BLD AUTO: 10 FL (ref 6–12)
POTASSIUM BLD-SCNC: 4.2 MMOL/L (ref 3.5–5.2)
PROT SERPL-MCNC: 7.2 G/DL (ref 6–8.5)
RBC # BLD AUTO: 4.33 10*6/MM3 (ref 3.77–5.28)
SODIUM BLD-SCNC: 140 MMOL/L (ref 136–145)
WBC NRBC COR # BLD: 6.49 10*3/MM3 (ref 3.4–10.8)

## 2019-04-15 PROCEDURE — 36415 COLL VENOUS BLD VENIPUNCTURE: CPT

## 2019-04-15 PROCEDURE — 80053 COMPREHEN METABOLIC PANEL: CPT | Performed by: ORTHOPAEDIC SURGERY

## 2019-04-15 PROCEDURE — 93010 ELECTROCARDIOGRAM REPORT: CPT | Performed by: INTERNAL MEDICINE

## 2019-04-15 PROCEDURE — 85025 COMPLETE CBC W/AUTO DIFF WBC: CPT | Performed by: ORTHOPAEDIC SURGERY

## 2019-04-15 PROCEDURE — 93005 ELECTROCARDIOGRAM TRACING: CPT

## 2019-04-15 RX ORDER — HYDROCODONE BITARTRATE AND ACETAMINOPHEN 5; 325 MG/1; MG/1
1 TABLET ORAL EVERY 4 HOURS PRN
COMMUNITY
End: 2019-04-17 | Stop reason: HOSPADM

## 2019-04-15 NOTE — DISCHARGE INSTRUCTIONS
Take the following medications the morning of surgery with a small sip of water: LABETALOL AND PROTONIX    TO BE CALLED WITH ARRIVAL TIME    General Instructions:  • Do not eat solid food after midnight the night before surgery.  • You may drink clear liquids day of surgery but must stop at least one hour before your hospital arrival time.  • It is beneficial for you to have a clear drink that contains carbohydrates the day of surgery.  We suggest a 12 to 20 ounce bottle of Gatorade or Powerade for non-diabetic patients or a 12 to 20 ounce bottle of G2 or Powerade Zero for diabetic patients. (Pediatric patients, are not advised to drink a 12 to 20 ounce carbohydrate drink)    Clear liquids are liquids you can see through.  Nothing red in color.     Plain water                               Sports drinks  Sodas                                   Gelatin (Jell-O)  Fruit juices without pulp such as white grape juice and apple juice  Popsicles that contain no fruit or yogurt  Tea or coffee (no cream or milk added)  Gatorade / Powerade  G2 / Powerade Zero    • Infants may have breast milk up to four hours before surgery.  • Infants drinking formula may drink formula up to six hours before surgery.   • Patients who avoid smoking, chewing tobacco and alcohol for 4 weeks prior to surgery have a reduced risk of post-operative complications.  Quit smoking as many days before surgery as you can.  • Do not smoke, use chewing tobacco or drink alcohol the day of surgery.   • If applicable bring your C-PAP/ BI-PAP machine.  • Bring any papers given to you in the doctor’s office.  • Wear clean comfortable clothes and socks.  • Do not wear contact lenses, false eyelashes or make-up.  Bring a case for your glasses.   • Bring crutches or walker if applicable.  • Remove all piercings.  Leave jewelry and any other valuables at home.  • Hair extensions with metal clips must be removed prior to surgery.  • The Pre-Admission Testing nurse  will instruct you to bring medications if unable to obtain an accurate list in Pre-Admission Testing.        Preventing a Surgical Site Infection:  • For 2 to 3 days before surgery, avoid shaving with a razor because the razor can irritate skin and make it easier to develop an infection.    • Any areas of open skin can increase the risk of a post-operative wound infection by allowing bacteria to enter and travel throughout the body.  Notify your surgeon if you have any skin wounds / rashes even if it is not near the expected surgical site.  The area will need assessed to determine if surgery should be delayed until it is healed.  • The night prior to surgery sleep in a clean bed with clean clothing.  Do not allow pets to sleep with you.  • Shower on the morning of surgery using a fresh bar of anti-bacterial soap (such as Dial) and clean washcloth.  Dry with a clean towel and dress in clean clothing.  • Ask your surgeon if you will be receiving antibiotics prior to surgery.  • Make sure you, your family, and all healthcare providers clean their hands with soap and water or an alcohol based hand  before caring for you or your wound.    Day of surgery:  Upon arrival, a Pre-op nurse and Anesthesiologist will review your health history, obtain vital signs, and answer questions you may have.  The only belongings needed at this time will be your home medications and if applicable your C-PAP/BI-PAP machine.  If you are staying overnight your family can leave the rest of your belongings in the car and bring them to your room later.  A Pre-op nurse will start an IV and you may receive medication in preparation for surgery, including something to help you relax.  Your family will be able to see you in the Pre-op area.  While you are in surgery your family should notify the waiting room  if they leave the waiting room area and provide a contact phone number.    Please be aware that surgery does come with  discomfort.  We want to make every effort to control your discomfort so please discuss any uncontrolled symptoms with your nurse.   Your doctor will most likely have prescribed pain medications.      If you are going home after surgery you will receive individualized written care instructions before being discharged.  A responsible adult must drive you to and from the hospital on the day of your surgery and stay with you for 24 hours.    If you are staying overnight following surgery, you will be transported to your hospital room following the recovery period.  [facility] has all private rooms.    You have received a list of surgical assistants for your reference.  If you have any questions please call Pre-Admission Testing at [phone].  Deductibles and co-payments are collected on the day of service. Please be prepared to pay the required co-pay, deductible or deposit on the day of service as defined by your plan.

## 2019-04-17 ENCOUNTER — ANESTHESIA (OUTPATIENT)
Dept: PERIOP | Facility: HOSPITAL | Age: 64
End: 2019-04-17

## 2019-04-17 ENCOUNTER — ANESTHESIA EVENT (OUTPATIENT)
Dept: PERIOP | Facility: HOSPITAL | Age: 64
End: 2019-04-17

## 2019-04-17 ENCOUNTER — HOSPITAL ENCOUNTER (OUTPATIENT)
Facility: HOSPITAL | Age: 64
Setting detail: HOSPITAL OUTPATIENT SURGERY
Discharge: HOME | End: 2019-04-17
Attending: ORTHOPAEDIC SURGERY | Admitting: ORTHOPAEDIC SURGERY

## 2019-04-17 VITALS
RESPIRATION RATE: 16 BRPM | TEMPERATURE: 97.4 F | HEART RATE: 71 BPM | WEIGHT: 186.51 LBS | DIASTOLIC BLOOD PRESSURE: 90 MMHG | OXYGEN SATURATION: 96 % | SYSTOLIC BLOOD PRESSURE: 165 MMHG | BODY MASS INDEX: 33.04 KG/M2

## 2019-04-17 PROCEDURE — 25010000002 DEXAMETHASONE PER 1 MG: Performed by: ANESTHESIOLOGY

## 2019-04-17 PROCEDURE — C1713 ANCHOR/SCREW BN/BN,TIS/BN: HCPCS | Performed by: ORTHOPAEDIC SURGERY

## 2019-04-17 PROCEDURE — 25010000002 HYDRALAZINE PER 20 MG

## 2019-04-17 PROCEDURE — 25010000002 DEXAMETHASONE PER 1 MG: Performed by: NURSE ANESTHETIST, CERTIFIED REGISTERED

## 2019-04-17 PROCEDURE — L3670 SO ACRO/CLAV CAN WEB PRE OTS: HCPCS | Performed by: ORTHOPAEDIC SURGERY

## 2019-04-17 PROCEDURE — 25010000002 PROPOFOL 10 MG/ML EMULSION: Performed by: NURSE ANESTHETIST, CERTIFIED REGISTERED

## 2019-04-17 PROCEDURE — 25010000002 ROPIVACAINE PER 1 MG: Performed by: ANESTHESIOLOGY

## 2019-04-17 PROCEDURE — 25010000002 PHENYLEPHRINE PER 1 ML: Performed by: NURSE ANESTHETIST, CERTIFIED REGISTERED

## 2019-04-17 PROCEDURE — 25010000002 ONDANSETRON PER 1 MG: Performed by: NURSE ANESTHETIST, CERTIFIED REGISTERED

## 2019-04-17 PROCEDURE — 25010000002 EPINEPHRINE PER 0.1 MG: Performed by: ORTHOPAEDIC SURGERY

## 2019-04-17 PROCEDURE — 25010000002 MIDAZOLAM PER 1 MG: Performed by: ANESTHESIOLOGY

## 2019-04-17 PROCEDURE — 25010000002 FENTANYL CITRATE (PF) 100 MCG/2ML SOLUTION: Performed by: ANESTHESIOLOGY

## 2019-04-17 DEVICE — SUT/ANCH JUGGERKNOT SFT NO2 MAXBRAID 2.9MM 10LOAD: Type: IMPLANTABLE DEVICE | Site: SHOULDER | Status: FUNCTIONAL

## 2019-04-17 RX ORDER — PROMETHAZINE HYDROCHLORIDE 25 MG/ML
6.25 INJECTION, SOLUTION INTRAMUSCULAR; INTRAVENOUS ONCE AS NEEDED
Status: DISCONTINUED | OUTPATIENT
Start: 2019-04-17 | End: 2019-04-17 | Stop reason: HOSPADM

## 2019-04-17 RX ORDER — ROPIVACAINE HYDROCHLORIDE 5 MG/ML
INJECTION, SOLUTION EPIDURAL; INFILTRATION; PERINEURAL
Status: COMPLETED | OUTPATIENT
Start: 2019-04-17 | End: 2019-04-17

## 2019-04-17 RX ORDER — DEXAMETHASONE SODIUM PHOSPHATE 4 MG/ML
INJECTION, SOLUTION INTRA-ARTICULAR; INTRALESIONAL; INTRAMUSCULAR; INTRAVENOUS; SOFT TISSUE
Status: COMPLETED | OUTPATIENT
Start: 2019-04-17 | End: 2019-04-17

## 2019-04-17 RX ORDER — OXYCODONE AND ACETAMINOPHEN 7.5; 325 MG/1; MG/1
1 TABLET ORAL EVERY 4 HOURS PRN
Status: DISCONTINUED | OUTPATIENT
Start: 2019-04-17 | End: 2019-04-17 | Stop reason: HOSPADM

## 2019-04-17 RX ORDER — MIDAZOLAM HYDROCHLORIDE 1 MG/ML
1 INJECTION INTRAMUSCULAR; INTRAVENOUS
Status: DISCONTINUED | OUTPATIENT
Start: 2019-04-17 | End: 2019-04-17 | Stop reason: HOSPADM

## 2019-04-17 RX ORDER — FENTANYL CITRATE 50 UG/ML
100 INJECTION, SOLUTION INTRAMUSCULAR; INTRAVENOUS
Status: DISCONTINUED | OUTPATIENT
Start: 2019-04-17 | End: 2019-04-17 | Stop reason: HOSPADM

## 2019-04-17 RX ORDER — DEXAMETHASONE SODIUM PHOSPHATE 10 MG/ML
INJECTION INTRAMUSCULAR; INTRAVENOUS AS NEEDED
Status: DISCONTINUED | OUTPATIENT
Start: 2019-04-17 | End: 2019-04-17 | Stop reason: SURG

## 2019-04-17 RX ORDER — CLINDAMYCIN PHOSPHATE 600 MG/50ML
600 INJECTION INTRAVENOUS EVERY 8 HOURS
Status: DISCONTINUED | OUTPATIENT
Start: 2019-04-17 | End: 2019-04-17 | Stop reason: HOSPADM

## 2019-04-17 RX ORDER — ONDANSETRON 2 MG/ML
INJECTION INTRAMUSCULAR; INTRAVENOUS AS NEEDED
Status: DISCONTINUED | OUTPATIENT
Start: 2019-04-17 | End: 2019-04-17 | Stop reason: SURG

## 2019-04-17 RX ORDER — EPHEDRINE SULFATE 50 MG/ML
INJECTION, SOLUTION INTRAVENOUS AS NEEDED
Status: DISCONTINUED | OUTPATIENT
Start: 2019-04-17 | End: 2019-04-17 | Stop reason: SURG

## 2019-04-17 RX ORDER — ROPIVACAINE HYDROCHLORIDE 2 MG/ML
INJECTION, SOLUTION EPIDURAL; INFILTRATION; PERINEURAL
Status: COMPLETED | OUTPATIENT
Start: 2019-04-17 | End: 2019-04-17

## 2019-04-17 RX ORDER — SODIUM CHLORIDE 0.9 % (FLUSH) 0.9 %
1-10 SYRINGE (ML) INJECTION AS NEEDED
Status: DISCONTINUED | OUTPATIENT
Start: 2019-04-17 | End: 2019-04-17 | Stop reason: HOSPADM

## 2019-04-17 RX ORDER — FENTANYL CITRATE 50 UG/ML
50 INJECTION, SOLUTION INTRAMUSCULAR; INTRAVENOUS
Status: DISCONTINUED | OUTPATIENT
Start: 2019-04-17 | End: 2019-04-17 | Stop reason: HOSPADM

## 2019-04-17 RX ORDER — HYDROCODONE BITARTRATE AND ACETAMINOPHEN 7.5; 325 MG/1; MG/1
1 TABLET ORAL ONCE AS NEEDED
Status: DISCONTINUED | OUTPATIENT
Start: 2019-04-17 | End: 2019-04-17 | Stop reason: HOSPADM

## 2019-04-17 RX ORDER — MIDAZOLAM HYDROCHLORIDE 1 MG/ML
2 INJECTION INTRAMUSCULAR; INTRAVENOUS
Status: DISCONTINUED | OUTPATIENT
Start: 2019-04-17 | End: 2019-04-17 | Stop reason: HOSPADM

## 2019-04-17 RX ORDER — HYDRALAZINE HYDROCHLORIDE 20 MG/ML
5 INJECTION INTRAMUSCULAR; INTRAVENOUS
Status: ACTIVE | OUTPATIENT
Start: 2019-04-17 | End: 2019-04-17

## 2019-04-17 RX ORDER — SODIUM CHLORIDE, SODIUM LACTATE, POTASSIUM CHLORIDE, CALCIUM CHLORIDE 600; 310; 30; 20 MG/100ML; MG/100ML; MG/100ML; MG/100ML
9 INJECTION, SOLUTION INTRAVENOUS CONTINUOUS
Status: DISCONTINUED | OUTPATIENT
Start: 2019-04-17 | End: 2019-04-17 | Stop reason: HOSPADM

## 2019-04-17 RX ORDER — FAMOTIDINE 10 MG/ML
20 INJECTION, SOLUTION INTRAVENOUS ONCE
Status: COMPLETED | OUTPATIENT
Start: 2019-04-17 | End: 2019-04-17

## 2019-04-17 RX ORDER — HYDRALAZINE HYDROCHLORIDE 20 MG/ML
INJECTION INTRAMUSCULAR; INTRAVENOUS
Status: COMPLETED
Start: 2019-04-17 | End: 2019-04-17

## 2019-04-17 RX ORDER — FLUMAZENIL 0.1 MG/ML
0.2 INJECTION INTRAVENOUS AS NEEDED
Status: DISCONTINUED | OUTPATIENT
Start: 2019-04-17 | End: 2019-04-17 | Stop reason: HOSPADM

## 2019-04-17 RX ORDER — OXYCODONE HYDROCHLORIDE AND ACETAMINOPHEN 5; 325 MG/1; MG/1
2 TABLET ORAL EVERY 4 HOURS PRN
Qty: 40 TABLET | Refills: 0 | Status: SHIPPED | OUTPATIENT
Start: 2019-04-17

## 2019-04-17 RX ORDER — HYDROMORPHONE HYDROCHLORIDE 1 MG/ML
0.5 INJECTION, SOLUTION INTRAMUSCULAR; INTRAVENOUS; SUBCUTANEOUS
Status: DISCONTINUED | OUTPATIENT
Start: 2019-04-17 | End: 2019-04-17 | Stop reason: HOSPADM

## 2019-04-17 RX ORDER — LIDOCAINE HYDROCHLORIDE 10 MG/ML
0.5 INJECTION, SOLUTION EPIDURAL; INFILTRATION; INTRACAUDAL; PERINEURAL ONCE AS NEEDED
Status: COMPLETED | OUTPATIENT
Start: 2019-04-17 | End: 2019-04-17

## 2019-04-17 RX ORDER — LIDOCAINE HYDROCHLORIDE 20 MG/ML
INJECTION, SOLUTION INFILTRATION; PERINEURAL AS NEEDED
Status: DISCONTINUED | OUTPATIENT
Start: 2019-04-17 | End: 2019-04-17 | Stop reason: SURG

## 2019-04-17 RX ORDER — EPHEDRINE SULFATE 50 MG/ML
5 INJECTION, SOLUTION INTRAVENOUS ONCE AS NEEDED
Status: DISCONTINUED | OUTPATIENT
Start: 2019-04-17 | End: 2019-04-17 | Stop reason: HOSPADM

## 2019-04-17 RX ORDER — PROMETHAZINE HYDROCHLORIDE 25 MG/1
25 SUPPOSITORY RECTAL ONCE AS NEEDED
Status: DISCONTINUED | OUTPATIENT
Start: 2019-04-17 | End: 2019-04-17 | Stop reason: HOSPADM

## 2019-04-17 RX ORDER — ONDANSETRON 2 MG/ML
4 INJECTION INTRAMUSCULAR; INTRAVENOUS ONCE AS NEEDED
Status: DISCONTINUED | OUTPATIENT
Start: 2019-04-17 | End: 2019-04-17 | Stop reason: HOSPADM

## 2019-04-17 RX ORDER — PROPOFOL 10 MG/ML
VIAL (ML) INTRAVENOUS AS NEEDED
Status: DISCONTINUED | OUTPATIENT
Start: 2019-04-17 | End: 2019-04-17 | Stop reason: SURG

## 2019-04-17 RX ORDER — SODIUM CHLORIDE 0.9 % (FLUSH) 0.9 %
3 SYRINGE (ML) INJECTION EVERY 12 HOURS SCHEDULED
Status: DISCONTINUED | OUTPATIENT
Start: 2019-04-17 | End: 2019-04-17 | Stop reason: HOSPADM

## 2019-04-17 RX ORDER — PROMETHAZINE HYDROCHLORIDE 25 MG/1
25 TABLET ORAL ONCE AS NEEDED
Status: DISCONTINUED | OUTPATIENT
Start: 2019-04-17 | End: 2019-04-17 | Stop reason: HOSPADM

## 2019-04-17 RX ADMIN — LIDOCAINE HYDROCHLORIDE 60 MG: 20 INJECTION, SOLUTION INFILTRATION; PERINEURAL at 09:03

## 2019-04-17 RX ADMIN — CLINDAMYCIN PHOSPHATE 600 MG: 12 INJECTION, SOLUTION INTRAVENOUS at 09:08

## 2019-04-17 RX ADMIN — MIDAZOLAM 1 MG: 1 INJECTION INTRAMUSCULAR; INTRAVENOUS at 07:47

## 2019-04-17 RX ADMIN — SODIUM CHLORIDE, POTASSIUM CHLORIDE, SODIUM LACTATE AND CALCIUM CHLORIDE 9 ML/HR: 600; 310; 30; 20 INJECTION, SOLUTION INTRAVENOUS at 07:47

## 2019-04-17 RX ADMIN — PHENYLEPHRINE HYDROCHLORIDE 100 MCG: 10 INJECTION INTRAVENOUS at 09:38

## 2019-04-17 RX ADMIN — DEXAMETHASONE SODIUM PHOSPHATE 6 MG: 10 INJECTION INTRAMUSCULAR; INTRAVENOUS at 09:10

## 2019-04-17 RX ADMIN — EPHEDRINE SULFATE 10 MG: 50 INJECTION INTRAMUSCULAR; INTRAVENOUS; SUBCUTANEOUS at 09:18

## 2019-04-17 RX ADMIN — LIDOCAINE HYDROCHLORIDE 0.5 ML: 10 INJECTION, SOLUTION EPIDURAL; INFILTRATION; INTRACAUDAL; PERINEURAL at 07:47

## 2019-04-17 RX ADMIN — FENTANYL CITRATE 100 MCG: 50 INJECTION, SOLUTION INTRAMUSCULAR; INTRAVENOUS at 08:11

## 2019-04-17 RX ADMIN — FAMOTIDINE 20 MG: 10 INJECTION INTRAVENOUS at 07:47

## 2019-04-17 RX ADMIN — DEXAMETHASONE SODIUM PHOSPHATE 4 MG: 4 INJECTION, SOLUTION INTRAMUSCULAR; INTRAVENOUS at 08:19

## 2019-04-17 RX ADMIN — SODIUM CHLORIDE, POTASSIUM CHLORIDE, SODIUM LACTATE AND CALCIUM CHLORIDE: 600; 310; 30; 20 INJECTION, SOLUTION INTRAVENOUS at 10:03

## 2019-04-17 RX ADMIN — ROPIVACAINE HYDROCHLORIDE 10 ML: 2 INJECTION, SOLUTION EPIDURAL; INFILTRATION at 08:19

## 2019-04-17 RX ADMIN — ONDANSETRON 4 MG: 2 INJECTION INTRAMUSCULAR; INTRAVENOUS at 10:03

## 2019-04-17 RX ADMIN — EPHEDRINE SULFATE 10 MG: 50 INJECTION INTRAMUSCULAR; INTRAVENOUS; SUBCUTANEOUS at 09:24

## 2019-04-17 RX ADMIN — EPHEDRINE SULFATE 10 MG: 50 INJECTION INTRAMUSCULAR; INTRAVENOUS; SUBCUTANEOUS at 09:27

## 2019-04-17 RX ADMIN — HYDRALAZINE HYDROCHLORIDE 5 MG: 20 INJECTION INTRAMUSCULAR; INTRAVENOUS at 10:46

## 2019-04-17 RX ADMIN — EPHEDRINE SULFATE 10 MG: 50 INJECTION INTRAMUSCULAR; INTRAVENOUS; SUBCUTANEOUS at 09:31

## 2019-04-17 RX ADMIN — MIDAZOLAM 1 MG: 1 INJECTION INTRAMUSCULAR; INTRAVENOUS at 08:11

## 2019-04-17 RX ADMIN — PHENYLEPHRINE HYDROCHLORIDE 100 MCG: 10 INJECTION INTRAVENOUS at 09:47

## 2019-04-17 RX ADMIN — PROPOFOL 200 MG: 10 INJECTION, EMULSION INTRAVENOUS at 09:03

## 2019-04-17 RX ADMIN — EPHEDRINE SULFATE 10 MG: 50 INJECTION INTRAMUSCULAR; INTRAVENOUS; SUBCUTANEOUS at 09:14

## 2019-04-17 RX ADMIN — ROPIVACAINE HYDROCHLORIDE 10 ML: 5 INJECTION, SOLUTION EPIDURAL; INFILTRATION; PERINEURAL at 08:19

## 2019-04-17 NOTE — DISCHARGE INSTRUCTIONS
General Anesthesia, Care After  Refer to this sheet in the next few weeks. These instructions provide you with information on caring for yourself after your procedure. Your health care provider may also give you more specific instructions. Your treatment has been planned according to current medical practices, but problems sometimes occur. Call your health care provider if you have any problems or questions after your procedure.  WHAT TO EXPECT AFTER THE PROCEDURE  After the procedure, it is typical to experience:  · Sleepiness.  · Nausea and vomiting.  HOME CARE INSTRUCTIONS  · For the first 24 hours after general anesthesia:  ¨ Have a responsible person with you.  ¨ Do not drive a car. If you are alone, do not take public transportation.  ¨ Do not drink alcohol.  ¨ Do not take medicine that has not been prescribed by your health care provider.  ¨ Do not sign important papers or make important decisions.  ¨ You may resume a normal diet and activities as directed by your health care provider.  · Change bandages (dressings) as directed.  · If you have questions or problems that seem related to general anesthesia, call the hospital and ask for the anesthetist or anesthesiologist on call.  SEEK MEDICAL CARE IF:  · You have nausea and vomiting that continue the day after anesthesia.  · You develop a rash.  SEEK IMMEDIATE MEDICAL CARE IF:   · You have difficulty breathing.  · You have chest pain.  · You have any allergic problems.     This information is not intended to replace advice given to you by your health care provider. Make sure you discuss any questions you have with your health care provider.     Document Released: 03/26/2002 Document Revised: 12/23/2014 Document Reviewed: 07/03/2014  [employer]® Patient Information ©2015 [employer]          What to expect after a Nerve Block    Nerve blocks administered to block pain affect many types of nerves, including those nerves that control movement, pain, and normal  sensation. Following a nerve block, you may notice some bruising at the site where the block was given. You may experience sensations such as: numbness of the affected area or limb, tingling, heaviness (that is the limb feels heavy to you), weakness or inability to move the affected arm or leg, or a feeling as if your arm or leg has “fallen asleep.”     A nerve block can last from 2 to 36 hours depending on the medications used.  Usually the weakness wears off first followed by the tingling and heaviness. As the block wears off, you may begin to notice pain; however, this sequence of events may occur in any order. Typically, you will be able to move your limb before you will feel it. Once a nerve block begins to wear off, the effects are usually completely gone within 60 minutes.  If you experience continued side effects that you believe are block related for longer than 48 hours, please call your healthcare provider. Please see block-specific instructions below.    Instructions for any block involving the shoulder or arm  • If you have had any kind of shoulder/arm block, you will go home with your arm in a sling. Wear the sling until the block has completely worn off. You may be required to wear it for a longer period of time per your surgeon’s recommendations.  • If you have had a shoulder/arm block, it is a good idea to sleep on a recliner with pillows under your arm.    You may experience symptoms such as:  Shortness of breath  Hoarseness   Blurry vision  Unequal pupils  Drooping of your face on the same side as the block was performed    These are side effects associated with this kind of block and should go away within 12 hours.    Note: If you have severe or prolonged shortness of breath, please seek medical assistance as soon as possible.     Protection of a “blocked” arm or leg (limb)  • After a nerve block, you cannot feel pain, pressure, or extremes of temperature in the affected limb. And because of  this, your blocked limb is at more risk for injury. For example, it is possible to burn your limb on an extremely hot surface without feeling it.     • When resting, it is important to reposition your limb periodically to avoid prolonged pressure on it. This may require the use of pillows and padding.    • While sleeping, you should avoid rolling onto the affected limb or putting too much pressure on it.     • If you have a cast or tight dressing, check the color of your fingers or toes of the affected limb. Call your surgeon if they look discolored (that is, dusky, dark colored).    • Use caution in cold weather. Cover your limb appropriately to protect it from the cold.      Pain Management:    Your surgeon will give you a prescription for pain medication. Begin taking this before the nerve block wears off. Bear in mind that sometimes the block can wear off in the middle of the night.     Shoulder Range of Motion Exercises  Shoulder range of motion (ROM) exercises are designed to keep the shoulder moving freely. They are often recommended for people who have shoulder pain.  Phase 1 exercises  When you are able, do this exercise 5-6 days per week, or as told by your health care provider. Work toward doing 2 sets of 10 swings.  Pendulum Exercise   How To Do This Exercise Lying Down  1. Lie face-down on a bed with your abdomen close to the side of the bed.  2. Let your arm hang over the side of the bed.  3. Relax your shoulder, arm, and hand.  4. Slowly and gently swing your arm forward and back. Do not use your neck muscles to swing your arm. They should be relaxed. If you are struggling to swing your arm, have someone gently swing it for you. When you do this exercise for the first time, swing your arm at a 15 degree angle for 15 seconds, or swing your arm 10 times. As pain lessens over time, increase the angle of the swing to 30-45 degrees.  5. Repeat steps 1-4 with the other arm.  How To Do This Exercise While  Standing  1. Stand next to a sturdy chair or table and hold on to it with your hand.  1. Bend forward at the waist.  2. Bend your knees slightly.  3. Relax your other arm and let it hang limp.  4. Relax the shoulder blade of the arm that is hanging and let it drop.  5. While keeping your shoulder relaxed, use body motion to swing your arm in small circles. The first time you do this exercise, swing your arm for about 30 seconds or 10 times. When you do it next time, swing your arm for a little longer.  6. Stand up tall and relax.  7. Repeat steps 1-7, this time changing the direction of the circles.  2. Repeat steps 1-8 with the other arm.  This information is not intended to replace advice given to you by your health care provider. Make sure you discuss any questions you have with your health care provider.  Document Released: 09/15/2004 Document Revised: 08/13/2017 Document Reviewed: 12/14/2015  [employer] Patient Education © 2017 [employer]    Acetaminophen; Oxycodone tablets  What is this medicine?  ACETAMINOPHEN; OXYCODONE (a set a KIERA maría fen; ox i KOE done) is a pain reliever. It is used to treat mild to moderate pain.  This medicine may be used for other purposes; ask your health care provider or pharmacist if you have questions.  COMMON BRAND NAME(S): Endocet, Magnacet, Narvox, Percocet, Perloxx, Primalev, Primlev, Roxicet, Xolox  What should I tell my health care provider before I take this medicine?  They need to know if you have any of these conditions:  -brain tumor  -Crohn's disease, inflammatory bowel disease, or ulcerative colitis  -drug abuse or addiction  -head injury  -heart or circulation problems  -if you often drink alcohol  -kidney disease or problems going to the bathroom  -liver disease  -lung disease, asthma, or breathing problems  -an unusual or allergic reaction to acetaminophen, oxycodone, other opioid analgesics, other medicines, foods, dyes, or preservatives  -pregnant or  trying to get pregnant  -breast-feeding  How should I use this medicine?  Take this medicine by mouth with a full glass of water. Follow the directions on the prescription label. Take your medicine at regular intervals. Do not take your medicine more often than directed.  Talk to your pediatrician regarding the use of this medicine in children. Special care may be needed.  Patients over 65 years old may have a stronger reaction and need a smaller dose.  Overdosage: If you think you have taken too much of this medicine contact a poison control center or emergency room at once.  NOTE: This medicine is only for you. Do not share this medicine with others.  What if I miss a dose?  If you miss a dose, take it as soon as you can. If it is almost time for your next dose, take only that dose. Do not take double or extra doses.  What may interact with this medicine?  -alcohol  -antihistamines  -barbiturates like amobarbital, butalbital, butabarbital, methohexital, pentobarbital, phenobarbital, thiopental, and secobarbital  -benztropine  -drugs for bladder problems like solifenacin, trospium, oxybutynin, tolterodine, hyoscyamine, and methscopolamine  -drugs for breathing problems like ipratropium and tiotropium  -drugs for certain stomach or intestine problems like propantheline, homatropine methylbromide, glycopyrrolate, atropine, belladonna, and dicyclomine  -general anesthetics like etomidate, ketamine, nitrous oxide, propofol, desflurane, enflurane, halothane, isoflurane, and sevoflurane  -medicines for depression, anxiety, or psychotic disturbances  -medicines for sleep  -muscle relaxants  -naltrexone  -narcotic medicines (opiates) for pain  -phenothiazines like perphenazine, thioridazine, chlorpromazine, mesoridazine, fluphenazine, prochlorperazine, promazine, and trifluoperazine  -scopolamine  -tramadol  -trihexyphenidyl  This list may not describe all possible interactions. Give your health care provider a list of all  the medicines, herbs, non-prescription drugs, or dietary supplements you use. Also tell them if you smoke, drink alcohol, or use illegal drugs. Some items may interact with your medicine.  What should I watch for while using this medicine?  Tell your doctor or health care professional if your pain does not go away, if it gets worse, or if you have new or a different type of pain. You may develop tolerance to the medicine. Tolerance means that you will need a higher dose of the medication for pain relief. Tolerance is normal and is expected if you take this medicine for a long time.  Do not suddenly stop taking your medicine because you may develop a severe reaction. Your body becomes used to the medicine. This does NOT mean you are addicted. Addiction is a behavior related to getting and using a drug for a non-medical reason. If you have pain, you have a medical reason to take pain medicine. Your doctor will tell you how much medicine to take. If your doctor wants you to stop the medicine, the dose will be slowly lowered over time to avoid any side effects.  You may get drowsy or dizzy. Do not drive, use machinery, or do anything that needs mental alertness until you know how this medicine affects you. Do not stand or sit up quickly, especially if you are an older patient. This reduces the risk of dizzy or fainting spells. Alcohol may interfere with the effect of this medicine. Avoid alcoholic drinks.  There are different types of narcotic medicines (opiates) for pain. If you take more than one type at the same time, you may have more side effects. Give your health care provider a list of all medicines you use. Your doctor will tell you how much medicine to take. Do not take more medicine than directed. Call emergency for help if you have problems breathing.  The medicine will cause constipation. Try to have a bowel movement at least every 2 to 3 days. If you do not have a bowel movement for 3 days, call your doctor  or health care professional.  Do not take Tylenol (acetaminophen) or medicines that have acetaminophen with this medicine. Too much acetaminophen can be very dangerous. Many nonprescription medicines contain acetaminophen. Always read the labels carefully to avoid taking more acetaminophen.  What side effects may I notice from receiving this medicine?  Side effects that you should report to your doctor or health care professional as soon as possible:  -allergic reactions like skin rash, itching or hives, swelling of the face, lips, or tongue  -breathing difficulties, wheezing  -confusion  -light headedness or fainting spells  -severe stomach pain  -unusually weak or tired  -yellowing of the skin or the whites of the eyes  Side effects that usually do not require medical attention (report to your doctor or health care professional if they continue or are bothersome):  -dizziness  -drowsiness  -nausea  -vomiting  This list may not describe all possible side effects. Call your doctor for medical advice about side effects. You may report side effects to FDA at 1-589-FDA-7065.  Where should I keep my medicine?  Keep out of the reach of children. This medicine can be abused. Keep your medicine in a safe place to protect it from theft. Do not share this medicine with anyone. Selling or giving away this medicine is dangerous and against the law.  Store at room temperature between 20 and 25 degrees C (68 and 77 degrees F). Keep container tightly closed. Protect from light.  This medicine may cause accidental overdose and death if it is taken by other adults, children, or pets. Flush any unused medicine down the toilet to reduce the chance of harm. Do not use the medicine after the expiration date.  NOTE: This sheet is a summary. It may not cover all possible information. If you have questions about this medicine, talk to your doctor, pharmacist, or health care provider.     © 2015, [employer]. (2014-08-11  13:17:35)

## 2019-04-17 NOTE — ANESTHESIA PREPROCEDURE EVALUATION
Anesthesia Evaluation     Patient summary reviewed and Nursing notes reviewed   NPO Solid Status: > 8 hours             Airway   Mallampati: II  TM distance: >3 FB  Neck ROM: full  no difficulty expected  Dental - normal exam     Pulmonary - negative pulmonary ROS and normal exam   Cardiovascular - normal exam    (+) hypertension,       Neuro/Psych  (+) CVA residual symptoms,     GI/Hepatic/Renal/Endo    (+) obesity,       Musculoskeletal (-) negative ROS    Abdominal  - normal exam   Substance History - negative use     OB/GYN negative ob/gyn ROS         Other        ROS/Med Hx Other: Cerebral aneurysm coiling                  Anesthesia Plan    ASA 3     general   (Isb popc)  intravenous induction   Anesthetic plan, all risks, benefits, and alternatives have been provided, discussed and informed consent has been obtained with: patient.    Plan discussed with CRNA.

## 2019-04-17 NOTE — ANESTHESIA PROCEDURE NOTES
Peripheral Block    Pre-sedation assessment completed: 4/17/2019 8:08 AM    Patient reassessed immediately prior to procedure    Patient location during procedure: pre-op  Start time: 4/17/2019 8:08 AM  Stop time: 4/17/2019 8:18 AM  Reason for block: at surgeon's request and post-op pain management  Performed by  Anesthesiologist: Donald Diamond MD  Preanesthetic Checklist  Completed: patient identified, site marked, surgical consent, pre-op evaluation, timeout performed, IV checked, risks and benefits discussed and monitors and equipment checked  Prep:  Pt Position: supine  Sterile barriers:cap, gloves, mask and sterile barriers  Prep: ChloraPrep  Patient monitoring: blood pressure monitoring, continuous pulse oximetry and EKG  Procedure  Sedation:yes  Performed under: local infiltration  Guidance:ultrasound guided  ULTRASOUND INTERPRETATION.  Using ultrasound guidance a 22 G gauge needle was placed in close proximity to the brachial plexus nerve, at which point, under ultrasound guidance anesthetic was injected in the area of the nerve and spread of the anesthesia was seen on ultrasound in close proximity thereto.  There were no abnormalities seen on ultrasound; a digital image was taken; and the patient tolerated the procedure with no complications. Images:still images obtained    Laterality:left  Block Type:interscalene  Injection Technique:single-shot  Needle Type:echogenic  Needle Gauge:22 G  Resistance on Injection: less than 15 psi  Medications Used: dexamethasone (DECADRON) injection, 4 mg  ropivacaine (NAROPIN) 0.5 % injection, 10 mL  ropivacaine (NAROPIN) 0.2 % injection, 10 mL  Med admintered at 4/17/2019 8:19 AM  Post Assessment  Injection Assessment: negative aspiration for heme, no paresthesia on injection and incremental injection  Patient Tolerance:comfortable throughout block  Complications:no  Additional Notes  rop 0.5 % 10cc   rop 0.2 % 10cc   dex 4mg

## 2019-04-17 NOTE — OP NOTE
SHOULDER ARTHROSCOPY WITH ROTATOR CUFF REPAIR  Procedure Note    Joanne Louie  4/17/2019    Pre-op Diagnosis:   1.  Left rotator cuff tear  2.  Impingement  3.  Labral tear    Post-op Diagnosis:     1.  Same  2.   3.     Procedure(s):  1.  Left shoulder arthroscopic rotator cuff repair with anchors x1  2.  Acromioplasty  3.  Debridement of the labrum  4.     Surgeon(s):  Jb Graham MD    Anesthesia: General with Block  Anesthesiologist: Donald Diamond MD  CRNA: Thais Ashraf CRNA    Staff:   Circulator: Starla Barriga RN  Scrub Person: Giuliana Albright  Vendor Representative: Cory Smith  Assistant: Chelsey Hicks      Drains: None    Estimated Blood Loss: minimal    Specimen: * No orders in the log *    Description of Procedure:   I.  Following induction of anesthesia the patient was placed in the beachchair position.  The left shoulder was then prepped and draped in a sterile fashion.  I injected the shoulder and created the posterior portal.  I inserted the cannula into the joint and found she had significant tearing of the labrum circumferentially.  I created the anterior portal and inserted the shaver and performed an extensive debridement of the entire labrum.  There is also significant chondral injury in the midpoint of the glenoid that was full-thickness.  The undersurface of the anterior supraspinatus showed a near full-thickness tear with minimal retraction.    II.  I then placed the camera into the subacromial space and created the lateral portal.  Patient was found to have a large bony impinging lesion.  I used the Arthrex thermal probe to release soft tissue from the acromion and to release the coracoacromial ligament.  I then inserted the 4 mm bur and performed a generous acromioplasty.  Approximately 8-9 mm of bone was removed.    III.  I then debrided the anterior supraspinatus of the atrophic tissue which created a round a 2 cm full-thickness tear with minimal retraction.   I placed a single Biomet 2.9 mm juggernaut suture anchor at the lateral margin of the tuberosity and used the Arthrex suture passing device past one limb of each suture through the lateral edge of the tear which at the time with an [facility] sliding knot.  We achieve complete coverage of the tuberosity.  I then removed the cannulas and closed the portals with a 3-0 nylon.  She is placed into a soft sterile dressing into her postop sling.  She will be discharged to recovery and then to home.    Jody Hicks CSA  assisted me in this procedure.  His presence was necessary to help with holding the patient's limb and the camera.  He allowed me to perform the procedure in a much quicker fashion resulting in less morbidity and risk for the patient.    Findings: See Dictation    Complications: None      Jb Graham MD     Date: 4/17/2019  Time: 10:02 AM

## 2019-04-17 NOTE — ANESTHESIA POSTPROCEDURE EVALUATION
Patient: Joanne Louie    Procedure Summary     Date:  04/17/19 [facility]    Anesthesia Start:  0859 Anesthesia Stop:  1016    Procedure:  LEFT SHOULDER ARTHROSCOPY WITH ROTATOR CUFF REPAIR AND ACROMIOPLASTY and labral debridement (Left Shoulder) Diagnosis:      Surgeon:  Jb Graham MD Provider:  Donald Diamond MD    Anesthesia Type:  general ASA Status:  3          Anesthesia Type: general  Last vitals  BP   154/85 (04/17/19 1115)   Temp   36.3 °C (97.4 °F) (04/17/19 1115)   Pulse   70 (04/17/19 1115)   Resp   16 (04/17/19 1115)     SpO2   96 % (04/17/19 1115)     Post Anesthesia Care and Evaluation    Patient location during evaluation: PACU  Patient participation: complete - patient participated  Level of consciousness: awake and alert  Pain management: adequate  Airway patency: patent  Anesthetic complications: No anesthetic complications    Cardiovascular status: acceptable  Respiratory status: acceptable  Hydration status: acceptable    Comments: ---------------------------               04/17/19 1115         ---------------------------   BP:          154/85         Pulse:         70           Resp:          16           Temp:   36.3 °C (97.4 °F)   SpO2:          96%         ---------------------------

## 2019-04-17 NOTE — ANESTHESIA PROCEDURE NOTES
Airway  Urgency: elective    Date/Time: 4/17/2019 9:05 AM  Airway not difficult    General Information and Staff    Patient location during procedure: OR  Anesthesiologist: Donald Diamond MD  CRNA: Thais Ashraf CRNA    Indications and Patient Condition  Indications for airway management: airway protection    Preoxygenated: yes  Mask difficulty assessment: 1 - vent by mask    Final Airway Details  Final airway type: supraglottic airway      Successful airway: classic  Size 4    Number of attempts at approach: 1    Additional Comments  Pre 02, sivi, easy bvm, lma placed easily, appears atraumatic, teeth unchanged

## 2019-04-30 ENCOUNTER — OFFICE VISIT (OUTPATIENT)
Dept: GASTROENTEROLOGY | Facility: CLINIC | Age: 64
End: 2019-04-30

## 2019-04-30 VITALS — TEMPERATURE: 97.9 F | WEIGHT: 186 LBS | BODY MASS INDEX: 32.96 KG/M2 | HEIGHT: 63 IN

## 2019-04-30 DIAGNOSIS — D50.0 IRON DEFICIENCY ANEMIA DUE TO CHRONIC BLOOD LOSS: ICD-10-CM

## 2019-04-30 DIAGNOSIS — K22.70 BARRETT'S ESOPHAGUS WITHOUT DYSPLASIA: ICD-10-CM

## 2019-04-30 DIAGNOSIS — K29.70 GASTRITIS DETERMINED BY ENDOSCOPY: Primary | ICD-10-CM

## 2019-04-30 PROCEDURE — 99214 OFFICE O/P EST MOD 30 MIN: CPT | Performed by: INTERNAL MEDICINE

## 2019-04-30 RX ORDER — HYDROCODONE BITARTRATE AND ACETAMINOPHEN 5; 325 MG/1; MG/1
TABLET ORAL
Refills: 0 | COMMUNITY
Start: 2019-04-18

## 2019-04-30 NOTE — PROGRESS NOTES
Chief Complaint   Patient presents with   • Anemia     Subjective     HPI  Joanne Louie is a 63 y.o. female who presents for follow up of iron deficiency anemia.  She was hospitalized  for symptomatic anemia.  She had an EGD showing Hoyt's esophagus, esophagitis, gastritis with no H pylori.  She had a colonoscopy in February with Dr Gaytan that was normal.    She has been taking ibuprofen and aspirin daily since discharge-- she did have to have shoulder surgery about a week and a half ago.    No overt bleeding.    Still taking pantoprazole.    Still taking iron pills-- taking every other day.        Past Medical History:   Diagnosis Date   • Anemia    • Aneurysm (CMS/HCC)    • Arthritis    • Hoyt esophagus    • Esophagitis, [place] grade B    • Gastritis    • GERD (gastroesophageal reflux disease)    • Hiatal hernia    • History of cervical cancer    • Hyperlipidemia    • Hypertension    • Rotator cuff tear    • Stroke (CMS/HCC)        Social History     Socioeconomic History   • Marital status: Legally      Spouse name: Not on file   • Number of children: Not on file   • Years of education: Not on file   • Highest education level: Not on file   Tobacco Use   • Smoking status: Former Smoker     Packs/day: 2.00     Years: 30.00     Pack years: 60.00     Types: Cigarettes     Last attempt to quit:      Years since quittin.3   • Smokeless tobacco: Never Used   Substance and Sexual Activity   • Alcohol use: No   • Drug use: No     Types: Marijuana     Comment: QUIT 2 YEARS AGO   • Sexual activity: Defer         Current Outpatient Medications:   •  acetaminophen (TYLENOL) 500 MG tablet, Take 1,000 mg by mouth Every 6 (Six) Hours As Needed for Mild Pain ., Disp: , Rfl:   •  aspirin 81 MG EC tablet, Take 81 mg by mouth Daily. HELD FOR SURGERY, Disp: , Rfl:   •  ferrous sulfate 325 (65 FE) MG tablet, Take 1 tablet by mouth Every Other Day., Disp: 15 tablet, Rfl: 0  •   HYDROcodone-acetaminophen (NORCO) 5-325 MG per tablet, TK 1 T PO  Q 6 H PRF PAIN . MAX D AMOUNT OF 4 TS, Disp: , Rfl: 0  •  labetalol (NORMODYNE) 300 MG tablet, Take 300 mg by mouth 2 (Two) Times a Day., Disp: , Rfl:   •  pantoprazole (PROTONIX) 40 MG EC tablet, Take 1 tablet by mouth Daily., Disp: 30 tablet, Rfl: 5  •  simvastatin (ZOCOR) 20 MG tablet, Take 20 mg by mouth Daily., Disp: , Rfl:   •  oxyCODONE-acetaminophen (PERCOCET) 5-325 MG per tablet, Take 2 tablets by mouth Every 4 (Four) Hours As Needed (use 1-2 pills for pain)., Disp: 40 tablet, Rfl: 0    Review of Systems   Constitutional: Negative for activity change, appetite change, chills and fever.   HENT: Negative for trouble swallowing.    Respiratory: Negative.    Cardiovascular: Negative.  Negative for chest pain.   Gastrointestinal: Negative for abdominal distention, abdominal pain, anal bleeding, constipation, diarrhea, nausea and vomiting.   Genitourinary: Negative for dysuria, frequency and hematuria.   Musculoskeletal: Positive for joint swelling.       Objective   Vitals:    04/30/19 1259   Temp: 97.9 °F (36.6 °C)         04/30/19  1259   Weight: 84.4 kg (186 lb)     Body mass index is 32.95 kg/m².      Physical Exam   Constitutional: She is oriented to person, place, and time. She appears well-developed and well-nourished. No distress.   HENT:   Head: Normocephalic and atraumatic.   Right Ear: External ear normal.   Left Ear: External ear normal.   Nose: Nose normal.   Mouth/Throat: Oropharynx is clear and moist.   Eyes: Conjunctivae and EOM are normal. Right eye exhibits no discharge. Left eye exhibits no discharge. No scleral icterus.   Neck: Normal range of motion. Neck supple. No thyromegaly present.   No supraclavicular adenopathy   Cardiovascular: Normal rate, regular rhythm, normal heart sounds and intact distal pulses. Exam reveals no gallop.   No murmur heard.  No lower extremity edema   Pulmonary/Chest: Effort normal and breath sounds  normal. No respiratory distress. She has no wheezes.   Abdominal: Soft. Normal appearance and bowel sounds are normal. She exhibits no distension and no mass. There is no hepatosplenomegaly. There is no tenderness. There is no rigidity, no rebound and no guarding. No hernia.   Genitourinary:   Genitourinary Comments: Rectal exam deferred   Musculoskeletal: She exhibits no edema or tenderness.   Left arm with bruising and in sling following shoulder surgery   Lymphadenopathy:     She has no cervical adenopathy.   Neurological: She is alert and oriented to person, place, and time. She displays no atrophy. Coordination normal.   Skin: Skin is warm and dry. No rash noted. She is not diaphoretic. No erythema.   Psychiatric: She has a normal mood and affect. Her behavior is normal. Judgment and thought content normal.   Vitals reviewed.      WBC   Date Value Ref Range Status   04/15/2019 6.49 3.40 - 10.80 10*3/mm3 Final     RBC   Date Value Ref Range Status   04/15/2019 4.33 3.77 - 5.28 10*6/mm3 Final     Hemoglobin   Date Value Ref Range Status   04/15/2019 10.8 (L) 12.0 - 15.9 g/dL Final     Hematocrit   Date Value Ref Range Status   04/15/2019 36.4 34.0 - 46.6 % Final     MCV   Date Value Ref Range Status   04/15/2019 84.1 79.0 - 97.0 fL Final     MCH   Date Value Ref Range Status   04/15/2019 24.9 (L) 26.6 - 33.0 pg Final     MCHC   Date Value Ref Range Status   04/15/2019 29.7 (L) 31.5 - 35.7 g/dL Final     RDW   Date Value Ref Range Status   04/15/2019 20.5 (H) 12.3 - 15.4 % Final     RDW-SD   Date Value Ref Range Status   04/15/2019 63.2 (H) 37.0 - 54.0 fl Final     MPV   Date Value Ref Range Status   04/15/2019 10.0 6.0 - 12.0 fL Final     Platelets   Date Value Ref Range Status   04/15/2019 325 140 - 450 10*3/mm3 Final     Neutrophil %   Date Value Ref Range Status   04/15/2019 66.2 42.7 - 76.0 % Final     Lymphocyte %   Date Value Ref Range Status   04/15/2019 24.2 19.6 - 45.3 % Final     Monocyte %   Date Value  Ref Range Status   04/15/2019 7.6 5.0 - 12.0 % Final     Eosinophil %   Date Value Ref Range Status   04/15/2019 0.9 0.3 - 6.2 % Final     Basophil %   Date Value Ref Range Status   04/15/2019 0.8 0.0 - 1.5 % Final     Immature Grans %   Date Value Ref Range Status   04/15/2019 0.3 0.0 - 0.5 % Final     Neutrophils, Absolute   Date Value Ref Range Status   04/15/2019 4.30 1.40 - 7.00 10*3/mm3 Final     Lymphocytes, Absolute   Date Value Ref Range Status   04/15/2019 1.57 0.70 - 3.10 10*3/mm3 Final     Monocytes, Absolute   Date Value Ref Range Status   04/15/2019 0.49 0.10 - 0.90 10*3/mm3 Final     Eosinophils, Absolute   Date Value Ref Range Status   04/15/2019 0.06 0.00 - 0.40 10*3/mm3 Final     Basophils, Absolute   Date Value Ref Range Status   04/15/2019 0.05 0.00 - 0.20 10*3/mm3 Final     Immature Grans, Absolute   Date Value Ref Range Status   04/15/2019 0.02 0.00 - 0.05 10*3/mm3 Final     nRBC   Date Value Ref Range Status   04/15/2019 0.0 0.0 - 0.0 /100 WBC Final       Lab Results   Component Value Date    GLUCOSE 128 (H) 04/15/2019    BUN 8 04/15/2019    CREATININE 0.83 04/15/2019    EGFRIFNONA 70 04/15/2019    BCR 9.6 04/15/2019    CO2 25.6 04/15/2019    CALCIUM 9.4 04/15/2019    ALBUMIN 4.60 04/15/2019    AST 14 04/15/2019    ALT 15 04/15/2019         Imaging Results (last 7 days)     ** No results found for the last 168 hours. **            Assessment/Plan    Iron deficiency anemia: Suspect this was related to her gastritis and ongoing NSAID use.  Improving with PPI and iron however she is still been taking NSAIDs    Gastritis: With erosions.  She has been on high-dose NSAIDs and continues to take this.    Hoyt's esophagus: No dysplasia, on pantoprazole    Plan  H&H today along with ferritin  I will have her continue the pantoprazole for the gastritis Hoyt's esophagus  Advise stopping ibuprofen, using Tylenol instead with caution to avoid the 3000 mg limit given that she is on pain medications as  well  Continue oral iron in the meantime    Joanne was seen today for anemia.    Diagnoses and all orders for this visit:    Gastritis determined by endoscopy    Iron deficiency anemia due to chronic blood loss  -     Hemoglobin & Hematocrit, Blood  -     Ferritin    Hoyt's esophagus without dysplasia        Dictated utilizing Dragon dictation

## 2019-04-30 NOTE — PATIENT INSTRUCTIONS
Labs today    Continue the pantoprazole    Stop the ibuprofen    Ok for tylenol up to 3000mg daily (this includes what is in your pain medication)

## 2019-05-01 ENCOUNTER — TELEPHONE (OUTPATIENT)
Dept: GASTROENTEROLOGY | Facility: CLINIC | Age: 64
End: 2019-05-01

## 2019-05-01 LAB
FERRITIN SERPL-MCNC: 18.4 NG/ML (ref 13–150)
HCT VFR BLD AUTO: 38.3 % (ref 34–46.6)
HGB BLD-MCNC: 11.1 G/DL (ref 12–15.9)

## 2019-05-01 NOTE — TELEPHONE ENCOUNTER
Called pt and advised per Dr Ibrahim that her hgb is up to 11.2 , iron stores have improved but are still on the low side.  Continue oral iron , pantoprazole, avoid ibuprofen , aspirin , alleve, etc. Pt verb understanding.

## 2019-05-01 NOTE — TELEPHONE ENCOUNTER
----- Message from Joanne Ibrahim MD sent at 5/1/2019 11:37 AM EDT -----  Her hemoglobin is up to 11.2, iron stores have improved but are still on the low side    Continue oral iron, pantoprazole, avoid ibuprogen, aspirin, alleve, etc

## 2019-05-09 ENCOUNTER — TRANSCRIBE ORDERS (OUTPATIENT)
Dept: PHYSICAL THERAPY | Facility: HOSPITAL | Age: 64
End: 2019-05-09

## 2019-05-09 DIAGNOSIS — Z98.890 S/P ARTHROSCOPY OF LEFT SHOULDER: Primary | ICD-10-CM

## 2019-05-10 ENCOUNTER — HOSPITAL ENCOUNTER (OUTPATIENT)
Dept: PHYSICAL THERAPY | Facility: HOSPITAL | Age: 64
Setting detail: THERAPIES SERIES
Discharge: HOME | End: 2019-05-10

## 2019-05-10 DIAGNOSIS — Z98.890 S/P LEFT ROTATOR CUFF REPAIR: ICD-10-CM

## 2019-05-10 DIAGNOSIS — M25.512 ACUTE PAIN OF LEFT SHOULDER: Primary | ICD-10-CM

## 2019-05-10 PROCEDURE — 97110 THERAPEUTIC EXERCISES: CPT | Performed by: PHYSICAL THERAPIST

## 2019-05-10 PROCEDURE — 97162 PT EVAL MOD COMPLEX 30 MIN: CPT | Performed by: PHYSICAL THERAPIST

## 2019-05-10 NOTE — THERAPY EVALUATION
Outpatient Physical Therapy Ortho Initial Evaluation  [facility]     Patient Name: Joanne Louie  : 1955  MRN: [id number]  Today's Date: 5/10/2019      Visit Date: 05/10/2019    Patient Active Problem List   Diagnosis   • Vasovagal near syncope   • Leukocytosis due to recent steroid injection   • Left shoulder pain   • Essential hypertension   • Aneurysm (CMS/HCC)   • Iron deficiency anemia   • Acute kidney injury (CMS/HCC)   • Esophagitis determined by endoscopy   • Gastritis determined by endoscopy   • Duodenitis without bleeding   • Hoyt's esophagus without dysplasia        Past Medical History:   Diagnosis Date   • Anemia    • Aneurysm (CMS/HCC)    • Arthritis    • Hoyt esophagus    • Esophagitis, [place] grade B    • Gastritis    • GERD (gastroesophageal reflux disease)    • Hiatal hernia    • History of cervical cancer    • Hyperlipidemia    • Hypertension    • Rotator cuff tear    • Stroke (CMS/HCC)         Past Surgical History:   Procedure Laterality Date   • ABDOMINAL SURGERY     • CERVIX REMOVAL     • COLONOSCOPY     • ENDOSCOPY N/A 3/13/2019    Z line regular, 35 cm from incisors, Large HH, [place] Grade B reflux esophagitis, gastritis, duodenitis   • IR CEREBRAL ANEURYSM COILING     • SHOULDER ARTHROSCOPY W/ ROTATOR CUFF REPAIR Left 2019    Procedure: LEFT SHOULDER ARTHROSCOPY WITH ROTATOR CUFF REPAIR AND ACROMIOPLASTY and labral debridement;  Surgeon: Jb Graham MD;  [place] [facility] [place] [facility] [place]  Service: Orthopedics   • VASCULAR SURGERY         Visit Dx:     ICD-10-CM ICD-9-CM   1. Acute pain of left shoulder M25.512 719.41   2. S/P left rotator cuff repair Z98.890 V45.89         Patient History     Row Name 05/10/19 0800             History    Chief Complaint  Difficulty with daily activities;Muscle weakness;Pain  -      Date Current Problem(s) Began  02/10/19  -      Brief Description of Current Complaint  Pt is s/p L RC repair. She is in her sling. She has a  history of brain aneurysms, 2, and is unable to work - last one in 2003. Pain meds PRN now  -      Patient/Caregiver Goals  Relieve pain;Return to prior level of function;Improve mobility;Improve strength  -      Patient's Rating of General Health  Good  -      Hand Dominance  right-handed  -      Occupation/sports/leisure activities  disabled due to aneurysms; spending time with grandkids  -         Pain     Pain Location  Shoulder  -      Pain at Present  3  -      Pain at Worst  8  -      Pain Frequency  Constant/continuous  -      Pain Description  Aching;Sore;Tiring  -      What Performance Factors Make the Current Problem(s) WORSE?  sleeping, daily life  -      What Performance Factors Make the Current Problem(s) BETTER?  pain meds  -      Is your sleep disturbed?  Yes  -         Fall Risk Assessment    Any falls in the past year:  No  -         Services    Prior Rehab/Home Health Experiences  Yes  -         Daily Activities    Primary Language  English  -      How does patient learn best?  Listening  -      Teaching needs identified  Home Exercise Program;Management of Condition  -      Patient is concerned about/has problems with  Performing home management (household chores, shopping, care of dependents);Reaching over head;Repetitive movements of the hand, arm, shoulder  -      Does patient have problems with the following?  None  -      Barriers to learning  None  -      Pt Participated in POC and Goals  Yes  -         Safety    Are you being hurt, hit, or frightened by anyone at home or in your life?  No  -LH      Are you being neglected by a caregiver  No  -        User Key  (r) = Recorded By, (t) = Taken By, (c) = Cosigned By    Initials Name Provider Type     Nery Higgins PT Physical Therapist          PT Ortho     Row Name 05/10/19 0800       Posture/Observations    Rounded Shoulders  Mild  -LH       Shoulder Girdle Palpation    Subacromial Space   Left:;Tender  -LH    Supraspinatus Insertion  Left:;Tender  -LH       Right Upper Ext    Rt Shoulder Abduction AROM  142 deg  -LH    Rt Shoulder Abduction PROM  160 deg  -LH    Rt Shoulder Flexion AROM  153 deg  -LH    Rt Shoulder Flexion PROM  161 deg  -LH    Rt Shoulder External Rotation AROM  T3  -LH    Rt Shoulder External Rotation PROM  85 deg  -LH    Rt Shoulder Internal Rotation AROM  T12  -LH    Rt Shoulder Internal Rotation PROM  75 deg  -LH       Left Upper Ext    Lt Shoulder Abduction PROM  138 deg  -LH    Lt Shoulder Flexion PROM  140 deg  -LH    Lt Shoulder External Rotation PROM  70 deg  -LH    Lt Shoulder Internal Rotation PROM  62 deg  -LH       MMT Right Upper Ext    Rt Shoulder Flexion MMT, Gross Movement  (4+/5) good plus  -LH    Rt Shoulder ABduction MMT, Gross Movement  (4+/5) good plus  -LH    Rt Shoulder Internal Rotation MMT, Gross Movement  (4+/5) good plus  -LH    Rt Shoulder External Rotation MMT, Gross Movement  (4+/5) good plus  -LH       MMT Left Upper Ext    Lt Shoulder Flexion MMT, Gross Movement  (3-/5) fair minus  -LH    Lt Shoulder ABduction MMT, Gross Movement  (3-/5) fair minus  -LH    Lt Shoulder Internal Rotation MMT, Gross Movement  (3+/5) fair plus  -LH    Lt Shoulder External Rotation MMT, Gross Movement  (3-/5) fair minus  -LH    Lt Upper Extremity Comments   in neutral  -      User Key  (r) = Recorded By, (t) = Taken By, (c) = Cosigned By    Initials Name Provider Type     Nery Higgins, PT Physical Therapist                      Therapy Education  Education Details: precautions and use of sling  Given: HEP, Symptoms/condition management, Pain management, Posture/body mechanics  Program: New  How Provided: Verbal, Demonstration, Written  Provided to: Patient  Level of Understanding: Teach back education performed, Verbalized, Demonstrated     PT OP Goals     Row Name 05/10/19 0900          PT Short Term Goals    STG 1  Patient will be independent with education for  symptom management, joint protection and strategies to minimize stress on affected tissues  -     STG 1 Progress  New  -     STG 2  Pt to improve pain complaints to no more than 4/10 with ADLs  -     STG 2 Progress  [facility]     STG 3  Pt to improve shoulder PROM in flex=150 deg, abd=150 deg, ER=80 deg and IR=70 deg  -     STG 3 Progress  [facility]        Long Term Goals    LTG 1  Pt to improve pain complaints to no more than 2/10 with ADLs  -     LTG 1 Progress  [facility]     LTG 2  Pt to improve shoulder A/PROM in flex=115 deg/160 deg, abd=110/160 deg, ER=back of neck/85 deg and IR=L3/75 deg  -     LTG 2 Progress  New  [facility]     LTG 3  Pt to improve shoulder strength to 4 to 4+/5  -     LTG 3 Progress  [facility]     LTG 4  Pt to improve shoulder AROM in flex=150 deg, abd=140 deg, ER=T2 and IR=L1  -     LTG 4 Progress  [facility]     LTG 5  Pt to improve DASH score from 43% to 15% for overall functional improvement  -     LTG 5 Progress  New  [facility]     LTG 6  Patient will be independent and compliant with advanced home exercise program to facilitate self-management of symptoms.  -     LTG 6 Progress  New  -LH        Time Calculation    PT Goal Re-Cert Due Date  08/10/19  -       User Key  (r) = Recorded By, (t) = Taken By, (c) = Cosigned By    Initials Name Provider Type     Nery Higgins, PT Physical Therapist          PT Assessment/Plan     Row Name 05/10/19 1300          PT Assessment    Functional Limitations  Limitation in home management;Limitations in community activities;Limitations in functional capacity and performance;Performance in leisure activities;Performance in self-care ADL  -     Impairments  Joint mobility;Muscle strength;Pain;Posture;Range of motion;Poor body mechanics  -     Assessment Comments  Joanne Louie is a 63 y.o. year-old female referred to physical therapy for s/p L RC repair on 4/17/19. She presents with a evolving clinical presentation.  She has no comorbidities  and/or personal factors that may affect her progress in the plan of care.  Pt has decreased PROM in flex=140 deg, abd=138 deg, ER=70 deg, and IR=62 deg, decreased strength, and tenderness over the subacromial area. Pt was educated on continued use of her sling and activity precautions. Pt would benefit from therapy to help improve her ability to perform household chores, grooming activities, and have greater ease with [place] activities.  -     Please refer to paper survey for additional self-reported information  Yes  -     Rehab Potential  Good  -     Patient/caregiver participated in establishment of treatment plan and goals  Yes  -     Patient would benefit from skilled therapy intervention  Yes  -LH        PT Plan    PT Frequency  2x/week  -     Predicted Duration of Therapy Intervention (Therapy Eval)  8-10 weeks  -     Planned CPT's?  PT EVAL MOD COMPLELITY: 16717;PT THER PROC EA 15 MIN: 69794;PT MANUAL THERAPY EA 15 MIN: 18103;PT HOT OR COLD PACK TREAT MCARE  -     Physical Therapy Interventions (Optional Details)  home exercise program;joint mobilization;manual therapy techniques;modalities;patient/family education;postural re-education;ROM (Range of Motion);strengthening;stretching  -     PT Plan Comments  plan to see pt for skilled therapy 2x week  -       User Key  (r) = Recorded By, (t) = Taken By, (c) = Cosigned By    Initials Name Provider Type     Nery Higgins, PT Physical Therapist            Exercises     Row Name 05/10/19 0900             Total Minutes    73118 - PT Therapeutic Exercise Minutes  10  -         Exercise 1    Exercise Name 1  shoulder rolls and scap ret  -      Sets 1  1  -      Reps 1  10  -         Exercise 2    Exercise Name 2  table slides  -      Sets 2  1  -      Reps 2  10  -      Time 2  10 sec  -         Exercise 3    Exercise Name 3  biceps curls  -      Sets 3  1  -      Reps 3  10  -         Exercise 4    Exercise Name 4  AAROM ER  w/cane  -LH      Sets 4  1  -LH      Reps 4  10  -LH      Time 4  10 sec  -LH         Exercise 5    Exercise Name 5  pendulums  -        User Key  (r) = Recorded By, (t) = Taken By, (c) = Cosigned By    Initials Name Provider Type     Nery Higgins, PT Physical Therapist                        Outcome Measure Options: Disabilities of the Arm, Shoulder, and Hand (DASH)  DASH  Open a tight or new jar.: Moderate Difficulty  Write: No Difficulty  Turn a key: Mild Difficulty  Prepare a meal: Moderate Difficulty  Push open a heavy door: Moderate Difficulty  Place an object on a shelf above your head: Moderate Difficulty  Do heavy household chores (e.g., wash walls, wash floors): Moderate Difficulty  Garden or do yard work: Moderate Difficulty  Make a bed: Moderate Difficulty  Carry a shopping bag or briefcase: Moderate Difficulty  Carry a heavy object (over 10 lbs): Moderate Difficulty  Change a lightbulb overhead: Moderate Difficulty  Wash or blow dry your hair: Moderate Difficulty  Wash your back: Moderate Difficulty  Put on a pullover sweater: Mild Difficulty  Use a knife to cut food: Mild Difficulty  Recreational activities in which require little effort (e.g., cardplaying, knitting, etc.): Mild Difficulty  Recreational activities in which you take some force or impact through your arm, should or hand (e.g. golf, hammering, tennis, etc.): Moderate Difficulty  Recreational Activities in which you move your arm freely (e.g., frisbee, badminton, etc.): Moderate Difficulty  Manage transportation needs (getting from one place to another): Moderate Difficulty  Sexual Activities: No Difficulty  During the past week, to what extent has your arm, shoulder, or hand problem interfered with your normal social activites with family, friends, neighbors or groups?: Moderately  During the past week, were you limited in your work or other regular daily activities as a result of your arm, shoulder or hand problem?: Moderately  Limited  Arm, Shoulder, or hand pain: Moderate  Arm, shoulder or hand pain when you performed any specific activity: Moderate  Tingling (pins and needles) in your arm, shoulder, or hand: Moderate  Weakness in your arm, shoulder or hand: Moderate  Stiffness in your arm, shoulder or hand: Mild  During the past week, how much difficulty have you had sleeping because of the pain in your arm, shoulder or hand?: Moderate Difficiculty  I feel less capable, less confident or less useful because of my arm, shoulder or hand problem: Agree  DASH Sum : 82  Number of Questions Answered: 30  DASH Score: 43.33  DASH COMMENTS: non dominant arm         Time Calculation:     Start Time: 0840  Stop Time: 0925  Time Calculation (min): 45 min  Total Timed Code Minutes- PT: 45 minute(s)     Therapy Charges for Today     Code Description Service Date Service Provider Modifiers Qty    09003083344 HC PT THER PROC EA 15 MIN 5/10/2019 Nery Higgins, PT GP 1    16108793821 HC PT EVAL MOD COMPLEXITY 2 5/10/2019 Nery Higgins, PT GP 1          PT G-Codes  Outcome Measure Options: Disabilities of the Arm, Shoulder, and Hand (DASH)         Nery Higgins, PT  5/10/2019

## 2019-05-15 ENCOUNTER — HOSPITAL ENCOUNTER (OUTPATIENT)
Dept: PHYSICAL THERAPY | Facility: HOSPITAL | Age: 64
Setting detail: THERAPIES SERIES
Discharge: HOME | End: 2019-05-15

## 2019-05-15 DIAGNOSIS — M25.512 ACUTE PAIN OF LEFT SHOULDER: Primary | ICD-10-CM

## 2019-05-15 DIAGNOSIS — Z98.890 S/P LEFT ROTATOR CUFF REPAIR: ICD-10-CM

## 2019-05-15 PROCEDURE — 97110 THERAPEUTIC EXERCISES: CPT

## 2019-05-15 PROCEDURE — 97140 MANUAL THERAPY 1/> REGIONS: CPT

## 2019-05-15 NOTE — THERAPY TREATMENT NOTE
Outpatient Physical Therapy Ortho Treatment Note  [facility]     Patient Name: Joanne Louie  : 1955  MRN: [id number]  Today's Date: 5/15/2019      Visit Date: 05/15/2019    Visit Dx:    ICD-10-CM ICD-9-CM   1. Acute pain of left shoulder M25.512 719.41   2. S/P left rotator cuff repair Z98.890 V45.89       Patient Active Problem List   Diagnosis   • Vasovagal near syncope   • Leukocytosis due to recent steroid injection   • Left shoulder pain   • Essential hypertension   • Aneurysm (CMS/HCC)   • Iron deficiency anemia   • Acute kidney injury (CMS/HCC)   • Esophagitis determined by endoscopy   • Gastritis determined by endoscopy   • Duodenitis without bleeding   • Hoyt's esophagus without dysplasia        Past Medical History:   Diagnosis Date   • Anemia    • Aneurysm (CMS/HCC)    • Arthritis    • Hoyt esophagus    • Esophagitis, [place] grade B    • Gastritis    • GERD (gastroesophageal reflux disease)    • Hiatal hernia    • History of cervical cancer    • Hyperlipidemia    • Hypertension    • Rotator cuff tear    • Stroke (CMS/HCC)         Past Surgical History:   Procedure Laterality Date   • ABDOMINAL SURGERY     • CERVIX REMOVAL     • COLONOSCOPY     • ENDOSCOPY N/A 3/13/2019    Z line regular, 35 cm from incisors, Large HH, [place] Grade B reflux esophagitis, gastritis, duodenitis   • IR CEREBRAL ANEURYSM COILING     • SHOULDER ARTHROSCOPY W/ ROTATOR CUFF REPAIR Left 2019    Procedure: LEFT SHOULDER ARTHROSCOPY WITH ROTATOR CUFF REPAIR AND ACROMIOPLASTY and labral debridement;  Surgeon: Jb Graham MD;  Location: [facility] OR [facility] [place]  Service: Orthopedics   • VASCULAR SURGERY         PT Ortho     Row Name 05/15/19 0800       Subjective Comments    Subjective Comments  states she normally doesn't sleep well.  Pt speaks of her Hx brain surgery and full recovery.  Removed pillow from shoulder sling as not comfortable.  -SI       Subjective Pain    Able to rate subjective pain?  yes   -SI    Subjective Pain Comment  Sub-acute left sh pain , taking pain meds twice a day.  -SI       Left Upper Ext    Lt Shoulder Flexion PROM  150  -SI    Lt Shoulder External Rotation PROM  70  -SI      User Key  (r) = Recorded By, (t) = Taken By, (c) = Cosigned By    Initials Name Provider Type    ALEC MayesjuliánMary Lou PTA Physical Therapy Assistant                      PT Assessment/Plan     Row Name 05/15/19 0833          PT Assessment    Assessment Comments  PROM progressing easily.  Pt reminded no AROM away from body....  -SI       User Key  (r) = Recorded By, (t) = Taken By, (c) = Cosigned By    Initials Name Provider Type    Madiha Lopezsivan HANSEN PTA Physical Therapy Assistant            Exercises     Row Name 05/15/19 0800 05/15/19 0700          Subjective Comments    Subjective Comments  states she normally doesn't sleep well.  Pt speaks of her Hx brain surgery and full recovery.  Removed pillow from shoulder sling as not comfortable.  -SI  --        Subjective Pain    Able to rate subjective pain?  yes  -SI  --     Subjective Pain Comment  Sub-acute left sh pain , taking pain meds twice a day.  -SI  --        Total Minutes    90156 - PT Therapeutic Exercise Minutes  15  -SI  --     13994 - PT Manual Therapy Minutes  --  25  -SI        Exercise 1    Exercise Name 1  shoulder rolls and scap ret  -SI  --     Sets 1  1  -SI  --     Reps 1  10  -SI  --        Exercise 2    Exercise Name 2  table slides flex and scaption  -SI  --     Sets 2  1  -SI  --     Reps 2  10  -SI  --     Time 2  10 sec  -SI  --        Exercise 3    Exercise Name 3  biceps curls  -SI  --     Sets 3  1  -SI  --     Reps 3  10  -SI  --        Exercise 4    Exercise Name 4  AAROM ER w/cane  -SI  --     Sets 4  1  -SI  --     Reps 4  10  -SI  --     Time 4  10 sec  -SI  --        Exercise 5    Exercise Name 5  pendulums  -SI  --        Exercise 6    Exercise Name 6  supine AA flex with cane  -SI  --     Sets 6  1  -SI  --     Reps 6  10  -SI  --      Time 6  10  -SI  --        Exercise 7    Exercise Name 7  sub-mac isometric flex, ext, and abd  -SI  --     Sets 7  1  -SI  --     Reps 7  10  -SI  --     Time 7  5  -SI  --       User Key  (r) = Recorded By, (t) = Taken By, (c) = Cosigned By    Initials Name Provider Type    Mary Lou Lopez PTA Physical Therapy Assistant                      Manual Rx (last 36 hours)      Manual Treatments     Row Name 05/15/19 0700             Total Minutes    47307 - PT Manual Therapy Minutes  25  -SI         Manual Rx 1    Manual Rx 1 Location  left shoulder  -SI      Manual Rx 1 Type  oscillations and PROM  -SI      Manual Rx 1 Duration  25  -SI        User Key  (r) = Recorded By, (t) = Taken By, (c) = Cosigned By    Initials Name Provider Type    Mary Lou Lopez PTA Physical Therapy Assistant              Therapy Education  Given: HEP, Symptoms/condition management, Pain management(Post-op rehab at 4-6 weeks.  Wear sling as instructed.)  Program: Progressed  How Provided: Verbal, Demonstration, Written  Provided to: Patient  Level of Understanding: Teach back education performed              Time Calculation:   Start Time: 0750  Stop Time: 0830  Time Calculation (min): 40 min  Total Timed Code Minutes- PT: 40 minute(s)  Therapy Charges for Today     Code Description Service Date Service Provider Modifiers Qty    08713193553 HC PT THER PROC EA 15 MIN 5/15/2019 Mary Lou Matamoros PTA GP 1    93796684572 HC PT MANUAL THERAPY EA 15 MIN 5/15/2019 Mary Lou Matamoros PTA GP 2                    Mary Lou Matamoros PTA  5/15/2019

## 2019-05-17 ENCOUNTER — HOSPITAL ENCOUNTER (OUTPATIENT)
Dept: PHYSICAL THERAPY | Facility: HOSPITAL | Age: 64
Setting detail: THERAPIES SERIES
Discharge: HOME | End: 2019-05-17

## 2019-05-17 DIAGNOSIS — Z98.890 S/P LEFT ROTATOR CUFF REPAIR: ICD-10-CM

## 2019-05-17 DIAGNOSIS — M25.512 ACUTE PAIN OF LEFT SHOULDER: Primary | ICD-10-CM

## 2019-05-17 PROCEDURE — 97140 MANUAL THERAPY 1/> REGIONS: CPT

## 2019-05-17 PROCEDURE — 97110 THERAPEUTIC EXERCISES: CPT

## 2019-05-17 NOTE — THERAPY TREATMENT NOTE
Outpatient Physical Therapy Ortho Treatment Note  [facility]     Patient Name: Joanne Louie  : 1955  MRN: [id number]  Today's Date: 2019      Visit Date: 2019    Visit Dx:    ICD-10-CM ICD-9-CM   1. Acute pain of left shoulder M25.512 719.41   2. S/P left rotator cuff repair Z98.890 V45.89       Patient Active Problem List   Diagnosis   • Vasovagal near syncope   • Leukocytosis due to recent steroid injection   • Left shoulder pain   • Essential hypertension   • Aneurysm (CMS/HCC)   • Iron deficiency anemia   • Acute kidney injury (CMS/HCC)   • Esophagitis determined by endoscopy   • Gastritis determined by endoscopy   • Duodenitis without bleeding   • Hoyt's esophagus without dysplasia        Past Medical History:   Diagnosis Date   • Anemia    • Aneurysm (CMS/HCC)    • Arthritis    • Hoyt esophagus    • Esophagitis, [place] grade B    • Gastritis    • GERD (gastroesophageal reflux disease)    • Hiatal hernia    • History of cervical cancer    • Hyperlipidemia    • Hypertension    • Rotator cuff tear    • Stroke (CMS/HCC)         Past Surgical History:   Procedure Laterality Date   • ABDOMINAL SURGERY     • CERVIX REMOVAL     • COLONOSCOPY     • ENDOSCOPY N/A 3/13/2019    Z line regular, 35 cm from incisors, Large HH, LA Grade B reflux esophagitis, gastritis, duodenitis   • IR CEREBRAL ANEURYSM COILING     • SHOULDER ARTHROSCOPY W/ ROTATOR CUFF REPAIR Left 2019    Procedure: LEFT SHOULDER ARTHROSCOPY WITH ROTATOR CUFF REPAIR AND ACROMIOPLASTY and labral debridement;  Surgeon: Jb Graham MD;  Location: [facility] OR [facility] [place];  Service: Orthopedics   • VASCULAR SURGERY         PT Ortho     Row Name 19 0700       Subjective Comments    Subjective Comments  Sore after PT last time.    -SI       Subjective Pain    Able to rate subjective pain?  yes  -SI    Pre-Treatment Pain Level  5  -SI    Post-Treatment Pain Level  8  -SI    Subjective Pain Comment  Pain meds 1-2 times  a day as needed.  -SI       Posture/Observations    Observations  -- Wearing sling with pillow today  -SI    Posture/Observations Comments  -- Pt moves her LUE actively a lot and doesn't realize it   -SI       Left Upper Ext    Lt Shoulder Abduction PROM  140 with ease  -SI    Lt Shoulder Flexion PROM  155 with ease  -SI    Lt Shoulder External Rotation PROM  70 with ease  -SI    Lt Shoulder Internal Rotation PROM  65 with ease  -SI    Row Name 05/15/19 0800       Subjective Comments    Subjective Comments  states she normally doesn't sleep well.  Pt speaks of her Hx brain surgery and full recovery.  Removed pillow from shoulder sling as not comfortable.  -SI       Subjective Pain    Able to rate subjective pain?  yes  -SI    Subjective Pain Comment  Sub-acute left sh pain , taking pain meds twice a day.  -SI       Left Upper Ext    Lt Shoulder Flexion PROM  150  -SI    Lt Shoulder External Rotation PROM  70  -SI      User Key  (r) = Recorded By, (t) = Taken By, (c) = Cosigned By    Initials Name Provider Type    Mary Lou Lopez PTA Physical Therapy Assistant                      PT Assessment/Plan     Row Name 05/17/19 0748          PT Assessment    Assessment Comments  Pt observed to move her LUE a lot not realizing it when sling off and in the sling.  Cautioned.  PROM progressing with ease.  -SI       User Key  (r) = Recorded By, (t) = Taken By, (c) = Cosigned By    Initials Name Provider Type    Mary Lou Lopez PTA Physical Therapy Assistant            Exercises     Row Name 05/17/19 0700             Subjective Comments    Subjective Comments  Sore after PT last time.    -SI         Subjective Pain    Able to rate subjective pain?  yes  -SI      Pre-Treatment Pain Level  5  -SI      Post-Treatment Pain Level  8  -SI      Subjective Pain Comment  Pain meds 1-2 times a day as needed.  -SI         Total Minutes    12111 - PT Therapeutic Exercise Minutes  20  -SI      21967 - PT Manual Therapy Minutes  25   -SI         Exercise 1    Exercise Name 1  shoulder rolls and scap ret  -SI      Sets 1  1  -SI      Reps 1  10  -SI         Exercise 2    Exercise Name 2  table slides flex and scaption  -SI      Sets 2  1  -SI      Reps 2  10  -SI      Time 2  10 sec  -SI         Exercise 3    Exercise Name 3  biceps curls  -SI      Sets 3  1  -SI      Reps 3  10  -SI         Exercise 4    Exercise Name 4  AAROM ER w/cane  -SI      Sets 4  1  -SI      Reps 4  10  -SI      Time 4  10 sec  -SI         Exercise 5    Exercise Name 5  pendulums  -SI         Exercise 6    Exercise Name 6  supine AA flex with cane  -SI      Sets 6  1  -SI      Reps 6  10  -SI      Time 6  10  -SI         Exercise 7    Exercise Name 7  sub-max isometric flex, ext, and abd  -SI      Sets 7  1  -SI      Reps 7  10  -SI      Time 7  5  -SI         Exercise 8    Exercise Name 8  isometric IR and ER  -SI      Sets 8  1  -SI      Reps 8  10  -SI      Time 8  5   -SI        User Key  (r) = Recorded By, (t) = Taken By, (c) = Cosigned By    Initials Name Provider Type    Mary Lou Lopez PTA Physical Therapy Assistant                      Manual Rx (last 36 hours)      Manual Treatments     Row Name 05/17/19 0700             Total Minutes    71668 - PT Manual Therapy Minutes  25  -SI         Manual Rx 1    Manual Rx 1 Location  left shoulder  -SI      Manual Rx 1 Type  oscillations and PROM  -SI      Manual Rx 1 Duration  25  -SI        User Key  (r) = Recorded By, (t) = Taken By, (c) = Cosigned By    Initials Name Provider Type    Mary Lou Lopez PTA Physical Therapy Assistant          PT OP Goals     Row Name 05/17/19 0700          PT Short Term Goals    STG 1  Patient will be independent with education for symptom management, joint protection and strategies to minimize stress on affected tissues  -SI     STG 1 Progress  Progressing  -SI     STG 2  Pt to improve pain complaints to no more than 4/10 with ADLs  -SI     STG 2 Progress  Progressing  -SI      STG 3  Pt to improve shoulder PROM in flex=150 deg, abd=150 deg, ER=80 deg and IR=70 deg  -SI     STG 3 Progress  Progressing  -SI       User Key  (r) = Recorded By, (t) = Taken By, (c) = Cosigned By    Initials Name Provider Type    Mary Lou Lopez PTA Physical Therapy Assistant          Therapy Education  Given: HEP, Symptoms/condition management  Program: Progressed  How Provided: Demonstration, Written  Provided to: Patient  Level of Understanding: Teach back education performed              Time Calculation:   Start Time: 0710  Stop Time: 0755  Time Calculation (min): 45 min  Total Timed Code Minutes- PT: 45 minute(s)  Therapy Charges for Today     Code Description Service Date Service Provider Modifiers Qty    44415118330 HC PT THER PROC EA 15 MIN 5/17/2019 Mary Lou Matamoros PTA GP 1    00105143089 HC PT MANUAL THERAPY EA 15 MIN 5/17/2019 Mary Lou Matamoros PTA GP 2                    Mary Lou Matamoros PTA  5/17/2019

## 2019-05-22 ENCOUNTER — HOSPITAL ENCOUNTER (OUTPATIENT)
Dept: PHYSICAL THERAPY | Facility: HOSPITAL | Age: 64
Setting detail: THERAPIES SERIES
Discharge: HOME | End: 2019-05-22

## 2019-05-22 DIAGNOSIS — Z98.890 S/P LEFT ROTATOR CUFF REPAIR: ICD-10-CM

## 2019-05-22 DIAGNOSIS — M25.512 ACUTE PAIN OF LEFT SHOULDER: Primary | ICD-10-CM

## 2019-05-22 PROCEDURE — 97140 MANUAL THERAPY 1/> REGIONS: CPT

## 2019-05-22 PROCEDURE — 97110 THERAPEUTIC EXERCISES: CPT

## 2019-05-22 NOTE — THERAPY TREATMENT NOTE
Outpatient Physical Therapy Ortho Treatment Note  [facility]     Patient Name: Joanne Louie  : 1955  MRN: [id number]  Today's Date: 2019      Visit Date: 2019    Visit Dx:    ICD-10-CM ICD-9-CM   1. Acute pain of left shoulder M25.512 719.41   2. S/P left rotator cuff repair Z98.890 V45.89       Patient Active Problem List   Diagnosis   • Vasovagal near syncope   • Leukocytosis due to recent steroid injection   • Left shoulder pain   • Essential hypertension   • Aneurysm (CMS/HCC)   • Iron deficiency anemia   • Acute kidney injury (CMS/HCC)   • Esophagitis determined by endoscopy   • Gastritis determined by endoscopy   • Duodenitis without bleeding   • Hoyt's esophagus without dysplasia        Past Medical History:   Diagnosis Date   • Anemia    • Aneurysm (CMS/HCC)    • Arthritis    • Hoyt esophagus    • Esophagitis, [place] grade B    • Gastritis    • GERD (gastroesophageal reflux disease)    • Hiatal hernia    • History of cervical cancer    • Hyperlipidemia    • Hypertension    • Rotator cuff tear    • Stroke (CMS/HCC)         Past Surgical History:   Procedure Laterality Date   • ABDOMINAL SURGERY     • CERVIX REMOVAL     • COLONOSCOPY     • ENDOSCOPY N/A 3/13/2019    Z line regular, 35 cm from incisors, Large HH, LA Grade B reflux esophagitis, gastritis, duodenitis   • IR CEREBRAL ANEURYSM COILING     • SHOULDER ARTHROSCOPY W/ ROTATOR CUFF REPAIR Left 2019    Procedure: LEFT SHOULDER ARTHROSCOPY WITH ROTATOR CUFF REPAIR AND ACROMIOPLASTY and labral debridement;  Surgeon: Jb Graham MD;  [place] [facility] [place] [facility] [place]  Service: Orthopedics   • VASCULAR SURGERY         PT Ortho     Row Name 19 0800       Subjective Comments    Subjective Comments  Pt reports compliance with wearing the sling but also states she is using her arm a lot.  -SI       Subjective Pain    Able to rate subjective pain?  yes  -SI    Subjective Pain Comment  sh pain with activity.  No  pain at rest  -SI       Left Upper Ext    Lt Shoulder Abduction PROM  155  -SI    Lt Shoulder Flexion PROM  155  -SI    Lt Shoulder External Rotation PROM  70  -SI    Lt Shoulder Internal Rotation PROM  80  -SI    Lt Upper Extremity Comments   PROM progressing with ease  -SI      User Key  (r) = Recorded By, (t) = Taken By, (c) = Cosigned By    Initials Name Provider Type    ALEC Matamoros Mary Lou M, PTA Physical Therapy Assistant                            Exercises     Row Name 05/22/19 0800 05/22/19 0700          Subjective Comments    Subjective Comments  Pt reports compliance with wearing the sling but also states she is using her arm a lot.  -SI  --        Subjective Pain    Able to rate subjective pain?  yes  -SI  --     Subjective Pain Comment  sh pain with activity.  No pain at rest  -SI  --        Total Minutes    23250 - PT Therapeutic Exercise Minutes  20  -SI  --     76930 - PT Manual Therapy Minutes  --  25  -SI        Exercise 1    Exercise Name 1  shoulder rolls and scap ret  -SI  --     Sets 1  1  -SI  --     Reps 1  10  -SI  --        Exercise 2    Exercise Name 2  table slides flex and scaption  -SI  --     Sets 2  2  -SI  --     Reps 2  10  -SI  --     Time 2  10 sec  -SI  --        Exercise 3    Exercise Name 3  biceps curls  -SI  --     Sets 3  1  -SI  --     Reps 3  10  -SI  --        Exercise 4    Exercise Name 4  AAROM ER w/cane  -SI  --     Sets 4  1  -SI  --     Reps 4  10  -SI  --     Time 4  10 sec  -SI  --        Exercise 5    Exercise Name 5  pendulums  -SI  --        Exercise 6    Exercise Name 6  supine AA flex with cane  -SI  --     Sets 6  1  -SI  --     Reps 6  10  -SI  --     Time 6  10  -SI  --        Exercise 7    Exercise Name 7  sub-max isometric flex, ext, and abd  -SI  --     Sets 7  1  -SI  --     Reps 7  10  -SI  --     Time 7  5  -SI  --        Exercise 8    Exercise Name 8  isometric IR and ER  -SI  --     Sets 8  1  -SI  --     Reps 8  10  -SI  --     Time 8  5   -SI  --        User Key  (r) = Recorded By, (t) = Taken By, (c) = Cosigned By    Initials Name Provider Type    Mary Lou Lopez PTA Physical Therapy Assistant                      Manual Rx (last 36 hours)      Manual Treatments     Row Name 05/22/19 0700             Total Minutes    15858 - PT Manual Therapy Minutes  25  -SI         Manual Rx 1    Manual Rx 1 Location  left shoulder  -SI      Manual Rx 1 Type  oscillations and PROM  -SI      Manual Rx 1 Duration  25  -SI        User Key  (r) = Recorded By, (t) = Taken By, (c) = Cosigned By    Initials Name Provider Type    aMry Lou Lopez PTA Physical Therapy Assistant              Therapy Education  Given: HEP, Symptoms/condition management(Pt cautioned each visit not to actively use LUE, as she is observed to do without realizing it in PT)  Program: Reinforced  How Provided: Verbal, Demonstration, Written  Provided to: Patient  Level of Understanding: Teach back education performed              Time Calculation:   Start Time: 0800  Stop Time: 0845  Time Calculation (min): 45 min  Total Timed Code Minutes- PT: 45 minute(s)  Therapy Charges for Today     Code Description Service Date Service Provider Modifiers Qty    75353717366 HC PT THER PROC EA 15 MIN 5/22/2019 Mary Lou Matamoros PTA GP 1    65516889372 HC PT MANUAL THERAPY EA 15 MIN 5/22/2019 Mary Lou Matamoros PTA GP 2                    Mary Lou Matamoros PTA  5/22/2019

## 2019-05-24 ENCOUNTER — HOSPITAL ENCOUNTER (OUTPATIENT)
Dept: PHYSICAL THERAPY | Facility: HOSPITAL | Age: 64
Setting detail: THERAPIES SERIES
Discharge: HOME | End: 2019-05-24

## 2019-05-24 ENCOUNTER — DOCUMENTATION (OUTPATIENT)
Dept: PHYSICAL THERAPY | Facility: HOSPITAL | Age: 64
End: 2019-05-24

## 2019-05-24 DIAGNOSIS — M25.512 ACUTE PAIN OF LEFT SHOULDER: Primary | ICD-10-CM

## 2019-05-24 DIAGNOSIS — Z98.890 S/P LEFT ROTATOR CUFF REPAIR: ICD-10-CM

## 2019-05-24 PROCEDURE — 97140 MANUAL THERAPY 1/> REGIONS: CPT

## 2019-05-24 PROCEDURE — 97110 THERAPEUTIC EXERCISES: CPT

## 2019-05-24 NOTE — THERAPY TREATMENT NOTE
Outpatient Physical Therapy Ortho Treatment Note  [facility]     Patient Name: Joanne Louie  : 1955  MRN: [id number]  Today's Date: 2019      Visit Date: 2019    Visit Dx:    ICD-10-CM ICD-9-CM   1. Acute pain of left shoulder M25.512 719.41   2. S/P left rotator cuff repair Z98.890 V45.89       Patient Active Problem List   Diagnosis   • Vasovagal near syncope   • Leukocytosis due to recent steroid injection   • Left shoulder pain   • Essential hypertension   • Aneurysm (CMS/HCC)   • Iron deficiency anemia   • Acute kidney injury (CMS/HCC)   • Esophagitis determined by endoscopy   • Gastritis determined by endoscopy   • Duodenitis without bleeding   • Hoyt's esophagus without dysplasia        Past Medical History:   Diagnosis Date   • Anemia    • Aneurysm (CMS/HCC)    • Arthritis    • Hoyt esophagus    • Esophagitis, [place] grade B    • Gastritis    • GERD (gastroesophageal reflux disease)    • Hiatal hernia    • History of cervical cancer    • Hyperlipidemia    • Hypertension    • Rotator cuff tear    • Stroke (CMS/HCC)         Past Surgical History:   Procedure Laterality Date   • ABDOMINAL SURGERY     • CERVIX REMOVAL     • COLONOSCOPY     • ENDOSCOPY N/A 3/13/2019    Z line regular, 35 cm from incisors, Large HH, [place] Grade B reflux esophagitis, gastritis, duodenitis   • IR CEREBRAL ANEURYSM COILING     • SHOULDER ARTHROSCOPY W/ ROTATOR CUFF REPAIR Left 2019    Procedure: LEFT SHOULDER ARTHROSCOPY WITH ROTATOR CUFF REPAIR AND ACROMIOPLASTY and labral debridement;  Surgeon: Jb Graham MD;  Location: [facility] OR [facility] [place];  Service: Orthopedics   • VASCULAR SURGERY         PT Ortho     Row Name 19 0800       Subjective Comments    Subjective Comments  40 dollar co-pay each visit and can't do twice a week...RTMD nest week  -SI       Subjective Pain    Able to rate subjective pain?  yes  -SI    Subjective Pain Comment  5 at worst with pain meds 1-2 times a day  -SI  "      Left Upper Ext    Lt Shoulder Abduction PROM  155  -SI    Lt Shoulder Flexion PROM  145  -SI    Lt Shoulder External Rotation PROM  73  -SI    Lt Shoulder Internal Rotation PROM  73  -SI    Row Name 05/22/19 0800       Subjective Comments    Subjective Comments  Pt reports compliance with wearing the sling but also states she is using her arm a lot.  -SI       Subjective Pain    Able to rate subjective pain?  yes  -SI    Subjective Pain Comment  sh pain with activity.  No pain at rest  -SI       Left Upper Ext    Lt Shoulder Abduction PROM  155  -SI    Lt Shoulder Flexion PROM  155  -SI    Lt Shoulder External Rotation PROM  70  -SI    Lt Shoulder Internal Rotation PROM  80  -SI    Lt Upper Extremity Comments   PROM progressing with ease  -SI      User Key  (r) = Recorded By, (t) = Taken By, (c) = Cosigned By    Initials Name Provider Type    Mary Lou Lopez PTA Physical Therapy Assistant                      PT Assessment/Plan     Row Name 05/24/19 0849          PT Assessment    Assessment Comments  Pt RTMD on 5-28-19, see \"letter\" to MD  -SI       User Key  (r) = Recorded By, (t) = Taken By, (c) = Cosigned By    Initials Name Provider Type    Mary Lou Lopez PTA Physical Therapy Assistant            Exercises     Row Name 05/24/19 0800 05/24/19 0700          Subjective Comments    Subjective Comments  40 dollar co-pay each visit and can't do twice a week...RTMD nest week  -SI  --        Subjective Pain    Able to rate subjective pain?  yes  -SI  --     Subjective Pain Comment  5 at worst with pain meds 1-2 times a day  -SI  --        Total Minutes    13796 - PT Therapeutic Exercise Minutes  20  -SI  --     94527 - PT Manual Therapy Minutes  --  25  -SI        Exercise 1    Exercise Name 1  shoulder rolls and scap ret  -SI  --     Sets 1  1  -SI  --     Reps 1  10  -SI  --        Exercise 2    Exercise Name 2  table slides flex and scaption  -SI  --     Sets 2  2  -SI  --     Reps 2  10  -SI  --     " Time 2  10 sec  -SI  --        Exercise 3    Exercise Name 3  biceps curls  -SI  --     Sets 3  1  -SI  --     Reps 3  10  -SI  --        Exercise 4    Exercise Name 4  AAROM ER w/cane  -SI  --     Sets 4  1  -SI  --     Reps 4  10  -SI  --     Time 4  10 sec  -SI  --        Exercise 5    Exercise Name 5  pendulums  -SI  --        Exercise 6    Exercise Name 6  supine AA flex with cane  -SI  --     Sets 6  1  -SI  --     Reps 6  10  -SI  --     Time 6  10  -SI  --        Exercise 7    Exercise Name 7  sub-max isometric flex, ext, and abd  -SI  --     Sets 7  1  -SI  --     Reps 7  10  -SI  --     Time 7  5  -SI  --        Exercise 8    Exercise Name 8  isometric IR and ER  -SI  --     Sets 8  1  -SI  --     Reps 8  10  -SI  --     Time 8  5   -SI  --       User Key  (r) = Recorded By, (t) = Taken By, (c) = Cosigned By    Initials Name Provider Type    Mary Lou Lopez PTA Physical Therapy Assistant                      Manual Rx (last 36 hours)      Manual Treatments     Row Name 05/24/19 0700             Total Minutes    70164 - PT Manual Therapy Minutes  25  -SI         Manual Rx 1    Manual Rx 1 Location  left shoulder  -SI      Manual Rx 1 Type  oscillations and PROM  -SI      Manual Rx 1 Duration  25  -SI        User Key  (r) = Recorded By, (t) = Taken By, (c) = Cosigned By    Initials Name Provider Type    Mary Lou Lopez PTA Physical Therapy Assistant                             Time Calculation:   Start Time: 0800  Stop Time: 0844  Time Calculation (min): 44 min  Total Timed Code Minutes- PT: 45 minute(s)  Therapy Charges for Today     Code Description Service Date Service Provider Modifiers Qty    67005771845 HC PT THER PROC EA 15 MIN 5/24/2019 Mary Lou Matamoros PTA GP 1    00318079467 HC PT MANUAL THERAPY EA 15 MIN 5/24/2019 Mary Lou Matamoros PTA GP 2                    Mary Lou Matamoros PTA  5/24/2019

## 2019-05-29 ENCOUNTER — HOSPITAL ENCOUNTER (OUTPATIENT)
Dept: PHYSICAL THERAPY | Facility: HOSPITAL | Age: 64
Setting detail: THERAPIES SERIES
Discharge: HOME | End: 2019-05-29

## 2019-05-29 DIAGNOSIS — Z98.890 S/P LEFT ROTATOR CUFF REPAIR: ICD-10-CM

## 2019-05-29 DIAGNOSIS — M25.512 ACUTE PAIN OF LEFT SHOULDER: Primary | ICD-10-CM

## 2019-05-29 PROCEDURE — 97140 MANUAL THERAPY 1/> REGIONS: CPT

## 2019-05-29 PROCEDURE — 97110 THERAPEUTIC EXERCISES: CPT

## 2019-05-29 NOTE — THERAPY TREATMENT NOTE
Outpatient Physical Therapy Ortho Treatment Note  [facility]     Patient Name: Joanne Louie  : 1955  MRN: [id number]  Today's Date: 2019      Visit Date: 2019    Visit Dx:    ICD-10-CM ICD-9-CM   1. Acute pain of left shoulder M25.512 719.41   2. S/P left rotator cuff repair Z98.890 V45.89       Patient Active Problem List   Diagnosis   • Vasovagal near syncope   • Leukocytosis due to recent steroid injection   • Left shoulder pain   • Essential hypertension   • Aneurysm (CMS/HCC)   • Iron deficiency anemia   • Acute kidney injury (CMS/HCC)   • Esophagitis determined by endoscopy   • Gastritis determined by endoscopy   • Duodenitis without bleeding   • Hoyt's esophagus without dysplasia        Past Medical History:   Diagnosis Date   • Anemia    • Aneurysm (CMS/HCC)    • Arthritis    • Hoyt esophagus    • Esophagitis, [place] grade B    • Gastritis    • GERD (gastroesophageal reflux disease)    • Hiatal hernia    • History of cervical cancer    • Hyperlipidemia    • Hypertension    • Rotator cuff tear    • Stroke (CMS/HCC)         Past Surgical History:   Procedure Laterality Date   • ABDOMINAL SURGERY     • CERVIX REMOVAL     • COLONOSCOPY     • ENDOSCOPY N/A 3/13/2019    Z line regular, 35 cm from incisors, Large HH, LA Grade B reflux esophagitis, gastritis, duodenitis   • IR CEREBRAL ANEURYSM COILING     • SHOULDER ARTHROSCOPY W/ ROTATOR CUFF REPAIR Left 2019    Procedure: LEFT SHOULDER ARTHROSCOPY WITH ROTATOR CUFF REPAIR AND ACROMIOPLASTY and labral debridement;  Surgeon: Jb Graham MD;  [place] [facility] [place] [facility] [place]  Service: Orthopedics   • VASCULAR SURGERY         PT Ortho     Row Name 19 0800       Subjective Comments    Subjective Comments  RTMD and pt pleased with her progress and to continue PT.  ned DC'ed.  -SI       Subjective Pain    Able to rate subjective pain?  yes  -SI       Left Upper Ext    Lt Shoulder Abduction AROM  90  -SI    Lt Shoulder  Flexion AROM  130  -SI    Lt Shoulder External Rotation AROM  back head  -SI    Lt Shoulder Internal Rotation AROM  ipsi buttock  -SI      User Key  (r) = Recorded By, (t) = Taken By, (c) = Cosigned By    Initials Name Provider Type    Mary Lou Lopez PTA Physical Therapy Assistant                      PT Assessment/Plan     Row Name 05/29/19 0836          PT Assessment    Assessment Comments  Pt now 6 weeks post-op and sling DC'ed.  She is observed each session to be moving arm a lot so cautioned against lifting overhead.....One more session this week then decrease PT to once a week.  -SI       User Key  (r) = Recorded By, (t) = Taken By, (c) = Cosigned By    Initials Name Provider Type    Mary Lou Lopez PTA Physical Therapy Assistant            Exercises     Row Name 05/29/19 0800 05/29/19 0700          Subjective Comments    Subjective Comments  RTMD and pt pleased with her progress and to continue PT.  sling DC'ed.  -SI  --        Subjective Pain    Able to rate subjective pain?  yes  -SI  --        Total Minutes    36511 - PT Therapeutic Exercise Minutes  25  -SI  --     87467 - PT Manual Therapy Minutes  --  20  -SI        Exercise 1    Exercise Name 1  shoulder rolls and scap ret  -SI  --     Sets 1  1  -SI  --     Reps 1  10  -SI  --        Exercise 2    Exercise Name 2  table slides flex and scaption  -SI  --     Sets 2  2  -SI  --     Reps 2  10  -SI  --     Time 2  10 sec  -SI  --     Additional Comments  home  -SI  --        Exercise 3    Exercise Name 3  biceps curls  -SI  --     Sets 3  1  -SI  --     Reps 3  10  -SI  --        Exercise 4    Exercise Name 4  AAROM ER w/cane  -SI  --     Sets 4  1  -SI  --     Reps 4  10  -SI  --     Time 4  10 sec  -SI  --        Exercise 5    Exercise Name 5  isometric abduction  -SI  --     Sets 5  1  -SI  --     Reps 5  10  -SI  --        Exercise 6    Exercise Name 6  supine AA flex with cane  -SI  --     Sets 6  1  -SI  --     Reps 6  10  -SI  --     Time  6  10  -SI  --        Exercise 7    Exercise Name 7  supine Sh press ups  -SI  --     Sets 7  2  -SI  --     Reps 7  10  -SI  --     Time 7     -SI  --        Exercise 8    Exercise Name 8  supine, sh protraction, trace letters of alphabet.  -SI  --     Sets 8  1  -SI  --     Reps 8     -SI  --     Time 8     -SI  --        Exercise 9    Exercise Name 9  ER side  -SI  --     Sets 9  2  -SI  --     Reps 9  10  -SI  --       User Key  (r) = Recorded By, (t) = Taken By, (c) = Cosigned By    Initials Name Provider Type    Mary Lou Lopez PTA Physical Therapy Assistant                      Manual Rx (last 36 hours)      Manual Treatments     Row Name 05/29/19 0700             Total Minutes    15701 - PT Manual Therapy Minutes  20  -SI         Manual Rx 1    Manual Rx 1 Location  left shoulder  -SI      Manual Rx 1 Type  oscillations and PROM  -SI      Manual Rx 1 Duration  20  -SI        User Key  (r) = Recorded By, (t) = Taken By, (c) = Cosigned By    Initials Name Provider Type    Mary Lou Lopez PTA Physical Therapy Assistant              Therapy Education  Given: HEP, Symptoms/condition management(Protocol at 6-8 weeks discussed as pt tends to do too much already with LUE)  Program: Progressed  How Provided: Verbal, Demonstration, Written  Provided to: Patient  Level of Understanding: Teach back education performed              Time Calculation:   Start Time: 0800  Stop Time: 0845  Time Calculation (min): 45 min  Total Timed Code Minutes- PT: 45 minute(s)  Therapy Charges for Today     Code Description Service Date Service Provider Modifiers Qty    25328089782 HC PT THER PROC EA 15 MIN 5/29/2019 Mary Lou Matamoros PTA GP 2    89171233546 HC PT MANUAL THERAPY EA 15 MIN 5/29/2019 Mary Lou Matamoros PTA GP 1                    Mary Lou Matamoros PTA  5/29/2019

## 2019-05-31 ENCOUNTER — HOSPITAL ENCOUNTER (OUTPATIENT)
Dept: PHYSICAL THERAPY | Facility: HOSPITAL | Age: 64
Setting detail: THERAPIES SERIES
Discharge: HOME | End: 2019-05-31

## 2019-05-31 DIAGNOSIS — Z98.890 S/P LEFT ROTATOR CUFF REPAIR: ICD-10-CM

## 2019-05-31 DIAGNOSIS — M25.512 ACUTE PAIN OF LEFT SHOULDER: Primary | ICD-10-CM

## 2019-05-31 PROCEDURE — 97140 MANUAL THERAPY 1/> REGIONS: CPT

## 2019-05-31 PROCEDURE — 97110 THERAPEUTIC EXERCISES: CPT

## 2019-06-05 ENCOUNTER — HOSPITAL ENCOUNTER (OUTPATIENT)
Dept: PHYSICAL THERAPY | Facility: HOSPITAL | Age: 64
Setting detail: THERAPIES SERIES
Discharge: HOME | End: 2019-06-05

## 2019-06-05 DIAGNOSIS — M25.512 ACUTE PAIN OF LEFT SHOULDER: Primary | ICD-10-CM

## 2019-06-05 DIAGNOSIS — Z98.890 S/P LEFT ROTATOR CUFF REPAIR: ICD-10-CM

## 2019-06-05 PROCEDURE — 97110 THERAPEUTIC EXERCISES: CPT

## 2019-06-05 PROCEDURE — 97140 MANUAL THERAPY 1/> REGIONS: CPT

## 2019-06-05 NOTE — THERAPY TREATMENT NOTE
Outpatient Physical Therapy Ortho Treatment Note  [facility]     Patient Name: Joanne Louie  : 1955  MRN: [id number]  Today's Date: 2019      Visit Date: 2019    Visit Dx:    ICD-10-CM ICD-9-CM   1. Acute pain of left shoulder M25.512 719.41   2. S/P left rotator cuff repair Z98.890 V45.89       Patient Active Problem List   Diagnosis   • Vasovagal near syncope   • Leukocytosis due to recent steroid injection   • Left shoulder pain   • Essential hypertension   • Aneurysm (CMS/HCC)   • Iron deficiency anemia   • Acute kidney injury (CMS/HCC)   • Esophagitis determined by endoscopy   • Gastritis determined by endoscopy   • Duodenitis without bleeding   • Hoyt's esophagus without dysplasia        Past Medical History:   Diagnosis Date   • Anemia    • Aneurysm (CMS/HCC)    • Arthritis    • Hoyt esophagus    • Esophagitis, [place] grade B    • Gastritis    • GERD (gastroesophageal reflux disease)    • Hiatal hernia    • History of cervical cancer    • Hyperlipidemia    • Hypertension    • Rotator cuff tear    • Stroke (CMS/HCC)         Past Surgical History:   Procedure Laterality Date   • ABDOMINAL SURGERY     • CERVIX REMOVAL     • COLONOSCOPY     • ENDOSCOPY N/A 3/13/2019    Z line regular, 35 cm from incisors, Large HH, LA Grade B reflux esophagitis, gastritis, duodenitis   • IR CEREBRAL ANEURYSM COILING     • SHOULDER ARTHROSCOPY W/ ROTATOR CUFF REPAIR Left 2019    Procedure: LEFT SHOULDER ARTHROSCOPY WITH ROTATOR CUFF REPAIR AND ACROMIOPLASTY and labral debridement;  Surgeon: Jb Graham MD;  [place] [facility] [place] [facility] [place]  Service: Orthopedics   • VASCULAR SURGERY         PT Ortho     Row Name 19 0800       Subjective Comments    Subjective Comments  Shoulder is sore.  Speak of puting out [place] and cleaning house....trying to do all with LUE  -SI       Subjective Pain    Able to rate subjective pain?  yes  -SI    Subjective Pain Comment  5 at worst  -SI       Left  Upper Ext    Lt Shoulder Abduction PROM  155  -SI    Lt Shoulder Flexion PROM  155  -SI    Lt Shoulder External Rotation PROM  73  -SI    Lt Shoulder Internal Rotation PROM  75  -SI      User Key  (r) = Recorded By, (t) = Taken By, (c) = Cosigned By    Initials Name Provider Type    ALEC MayesjuliánMary Lou PTA Physical Therapy Assistant                      PT Assessment/Plan     Row Name 06/05/19 0844          PT Assessment    Assessment Comments  Pt doing PT once a week due to financial situation.  Her shoulder is sore and ER is weak at 3-/5.  Caution her each visit on precautions and protocol as i feels she does too much with ADL's.  She also needs lot of coaching to do ex correct and her male friend observes each session to help her at home.  -SI       User Key  (r) = Recorded By, (t) = Taken By, (c) = Cosigned By    Initials Name Provider Type    Mary Lou Lopez PTA Physical Therapy Assistant            Exercises     Row Name 06/05/19 0800 06/05/19 0700          Subjective Comments    Subjective Comments  Shoulder is sore.  Speak of puting out [place] and cleaning house....trying to do all with LUE  -SI  --        Subjective Pain    Able to rate subjective pain?  yes  -SI  --     Subjective Pain Comment  5 at worst  -SI  --        Total Minutes    73757 - PT Therapeutic Exercise Minutes  25  -SI  --     57080 - PT Manual Therapy Minutes  --  20  -SI        Exercise 1    Exercise Name 1  shoulder rolls and scap ret  -SI  --     Sets 1  1  -SI  --     Reps 1  10  -SI  --        Exercise 2    Exercise Name 2  table slides flex and scaption  -SI  --     Sets 2  2  -SI  --     Reps 2  10  -SI  --     Time 2  10 sec  -SI  --        Exercise 3    Exercise Name 3  biceps curls  -SI  --     Sets 3  1  -SI  --     Reps 3  10  -SI  --        Exercise 4    Exercise Name 4  AAROM ER w/cane  -SI  --     Sets 4  1  -SI  --     Reps 4  10  -SI  --     Time 4  10 sec  -SI  --        Exercise 5    Exercise Name 5  isometric  abduction, IR and ER  -SI  --     Sets 5  1  -SI  --     Reps 5  10  -SI  --        Exercise 6    Exercise Name 6  supine AA flex with cane  -SI  --     Sets 6  1  -SI  --     Reps 6  10  -SI  --     Time 6  10  -SI  --        Exercise 7    Exercise Name 7  supine Sh press ups  -SI  --     Sets 7  2  -SI  --     Reps 7  10  -SI  --     Time 7     -SI  --     Additional Comments  2lbs  -SI  --        Exercise 8    Exercise Name 8  supine, sh protraction, trace letters of alphabet.  -SI  --     Sets 8  1  -SI  --     Reps 8     -SI  --     Time 8     -SI  --     Additional Comments  hold 2 lbs  -SI  --        Exercise 9    Exercise Name 9  ER side  -SI  --     Sets 9  3  -SI  --     Reps 9  10  -SI  --     Additional Comments  active only  -SI  --        Exercise 10    Exercise Name 10  scapular retraction and Sh ext with red TB  -SI  --     Sets 10  2  -SI  --     Reps 10  10  -SI  --     Additional Comments  added to HEP  -SI  --       User Key  (r) = Recorded By, (t) = Taken By, (c) = Cosigned By    Initials Name Provider Type    Mary Lou Lopez PTA Physical Therapy Assistant                      Manual Rx (last 36 hours)      Manual Treatments     Row Name 06/05/19 0700             Total Minutes    95759 - PT Manual Therapy Minutes  20  -SI         Manual Rx 1    Manual Rx 1 Location  left shoulder  -SI      Manual Rx 1 Type  oscillations and PROM  -SI      Manual Rx 1 Duration  20  -SI        User Key  (r) = Recorded By, (t) = Taken By, (c) = Cosigned By    Initials Name Provider Type    Mary Lou Lopez PTA Physical Therapy Assistant              Therapy Education  Given: HEP, Symptoms/condition management  Program: Progressed  How Provided: Verbal, Demonstration, Written  Provided to: Patient  Level of Understanding: Teach back education performed              Time Calculation:   Start Time: 0800  Stop Time: 0846  Time Calculation (min): 46 min  Total Timed Code Minutes- PT: 45 minute(s)  Therapy Charges  for Today     Code Description Service Date Service Provider Modifiers Qty    77696505155 HC PT THER PROC EA 15 MIN 6/5/2019 Mary Lou Matamoros, PTA GP 2    04522155832 HC PT MANUAL THERAPY EA 15 MIN 6/5/2019 Mary Lou Matamoros, PTA GP 1                    Mary Lou Matamoros, LINDA  6/5/2019

## 2019-06-12 ENCOUNTER — HOSPITAL ENCOUNTER (OUTPATIENT)
Dept: PHYSICAL THERAPY | Facility: HOSPITAL | Age: 64
Setting detail: THERAPIES SERIES
Discharge: HOME | End: 2019-06-12

## 2019-06-12 DIAGNOSIS — Z98.890 S/P LEFT ROTATOR CUFF REPAIR: ICD-10-CM

## 2019-06-12 DIAGNOSIS — M25.512 ACUTE PAIN OF LEFT SHOULDER: Primary | ICD-10-CM

## 2019-06-12 PROCEDURE — 97140 MANUAL THERAPY 1/> REGIONS: CPT

## 2019-06-12 PROCEDURE — 97110 THERAPEUTIC EXERCISES: CPT

## 2019-06-12 NOTE — THERAPY PROGRESS REPORT/RE-CERT
Outpatient Physical Therapy Ortho Progress Note  [facility]     Patient Name: Joanne Louie  : 1955  MRN: [id number]  Today's Date: 2019      Visit Date: 2019    Visit Dx:    ICD-10-CM ICD-9-CM   1. Acute pain of left shoulder M25.512 719.41   2. S/P left rotator cuff repair Z98.890 V45.89       Patient Active Problem List   Diagnosis   • Vasovagal near syncope   • Leukocytosis due to recent steroid injection   • Left shoulder pain   • Essential hypertension   • Aneurysm (CMS/HCC)   • Iron deficiency anemia   • Acute kidney injury (CMS/HCC)   • Esophagitis determined by endoscopy   • Gastritis determined by endoscopy   • Duodenitis without bleeding   • Hoyt's esophagus without dysplasia        Past Medical History:   Diagnosis Date   • Anemia    • Aneurysm (CMS/HCC)    • Arthritis    • Hoyt esophagus    • Esophagitis, [place] grade B    • Gastritis    • GERD (gastroesophageal reflux disease)    • Hiatal hernia    • History of cervical cancer    • Hyperlipidemia    • Hypertension    • Rotator cuff tear    • Stroke (CMS/HCC)         Past Surgical History:   Procedure Laterality Date   • ABDOMINAL SURGERY     • CERVIX REMOVAL     • COLONOSCOPY     • ENDOSCOPY N/A 3/13/2019    Z line regular, 35 cm from incisors, Large HH, [place] Grade B reflux esophagitis, gastritis, duodenitis   • IR CEREBRAL ANEURYSM COILING     • SHOULDER ARTHROSCOPY W/ ROTATOR CUFF REPAIR Left 2019    Procedure: LEFT SHOULDER ARTHROSCOPY WITH ROTATOR CUFF REPAIR AND ACROMIOPLASTY and labral debridement;  Surgeon: Jb Graham MD;  [facility] [place] [facility];  Service: Orthopedics   • VASCULAR SURGERY         PT Ortho     Row Name 19 0800       Subjective Comments    Subjective Comments  Pt and her partner talk about doing yard work.  He states he watches her so not use LUE too much.  -SI       Subjective Pain    Able to rate subjective pain?  yes  -SI    Pre-Treatment Pain Level  0  -SI     Post-Treatment Pain Level  0  -SI    Subjective Pain Comment  No pain at rest, 4 at worst with ADL's.  -SI       Left Upper Ext    Lt Shoulder Abduction AROM  145  -SI    Lt Shoulder Abduction PROM  160  -SI    Lt Shoulder Flexion AROM  145  -SI    Lt Shoulder Flexion PROM  160  -SI    Lt Shoulder External Rotation AROM  T1  -SI    Lt Shoulder External Rotation PROM  80  -SI    Lt Shoulder Internal Rotation AROM  L1  -SI    Lt Shoulder Internal Rotation PROM  75  -SI       MMT Left Upper Ext    Lt Shoulder Flexion MMT, Gross Movement  (3+/5) fair plus  -SI    Lt Shoulder ABduction MMT, Gross Movement  (3+/5) fair plus  -SI    Lt Shoulder Internal Rotation MMT, Gross Movement  (3+/5) fair plus  -SI    Lt Shoulder External Rotation MMT, Gross Movement  (3/5) fair  -SI      User Key  (r) = Recorded By, (t) = Taken By, (c) = Cosigned By    Initials Name Provider Type     Mary Lou Matamoros, PTA Physical Therapy Assistant                      PT Assessment/Plan     Row Name 06/12/19 1300          PT Assessment    Assessment Comments  Joanne Louie has been seen for 9 physical therapy sessions for s/p L RC repair.  Treatment has included therapeutic exercise, manual therapy and patient education with home exercise program . Progress to physical therapy goals is good. She has improved her ROM in flex=145/160 deg, abd=145/160 deg, ER=T1/80 deg, and IR=L1/75 deg and strength it 3+/5. Pt improved her score on the DASH from 43% to 31.6%, with 0% being no disability. She will benefit from continued skilled physical therapy to address remaining impairments and functional limitations.   -        PT Plan    PT Plan Comments  cont with skilled therapy  -       User Key  (r) = Recorded By, (t) = Taken By, (c) = Cosigned By    Initials Name Provider Type     Nery Higgins, PT Physical Therapist            Exercises     Row Name 06/12/19 0800 06/12/19 0700          Subjective Comments    Subjective Comments  Pt and her partner  talk about doing yard work.  He states he watches her so not use LUE too much.  -SI  --        Subjective Pain    Able to rate subjective pain?  yes  -SI  --     Pre-Treatment Pain Level  0  -SI  --     Post-Treatment Pain Level  0  -SI  --     Subjective Pain Comment  No pain at rest, 4 at worst with ADL's.  -SI  --        Total Minutes    88644 - PT Therapeutic Exercise Minutes  25  -SI  --     82396 - PT Manual Therapy Minutes  --  20  -SI        Exercise 1    Exercise Name 1  shoulder rolls and scap ret  -SI  --     Sets 1  1  -SI  --     Reps 1  10  -SI  --        Exercise 2    Exercise Name 2  UBE  -SI  --     Sets 2     -SI  --     Reps 2     -SI  --     Time 2   4 min  -SI  --        Exercise 3    Exercise Name 3  biceps curls 2 lbs  -SI  --     Sets 3  2  -SI  --     Reps 3  10  -SI  --        Exercise 4    Exercise Name 4  AAROM ER w/cane  -SI  --     Sets 4  1  -SI  --     Reps 4  10  -SI  --     Time 4  10 sec  -SI  --        Exercise 5    Exercise Name 5  --  -SI  --     Sets 5  --  -SI  --     Reps 5  --  -SI  --        Exercise 6    Exercise Name 6  supine AA flex with cane  -SI  --     Sets 6  1  -SI  --     Reps 6  10  -SI  --     Time 6  10  -SI  --        Exercise 7    Exercise Name 7  supine Sh press ups 2 lbs  -SI  --     Sets 7  2  -SI  --     Reps 7  10  -SI  --     Time 7     -SI  --        Exercise 8    Exercise Name 8  supine, sh protraction, trace letters of alphabet.  -SI  --     Sets 8  1  -SI  --     Reps 8     -SI  --     Time 8     -SI  --     Additional Comments  2 lbs  -SI  --        Exercise 9    Exercise Name 9  ER side 1 lb  -SI  --     Sets 9  3  -SI  --     Reps 9  10  -SI  --        Exercise 10    Exercise Name 10  scapular retraction and Sh ext with green TB  -SI  --     Sets 10  2  -SI  --     Reps 10  10  -SI  --        Exercise 11    Exercise Name 11  IR and ER with yellow TB  -SI  --     Cueing 11  Verbal;Demo  -SI  --     Reps 11  2  -SI  --     Time 11  10  -SI  --      Additional Comments  closely supervised  -SI  --       User Key  (r) = Recorded By, (t) = Taken By, (c) = Cosigned By    Initials Name Provider Type    ALEC SugeyMadiha gallegosivan HANSEN PTA Physical Therapy Assistant                      Manual Rx (last 36 hours)      Manual Treatments     Row Name 06/12/19 0700             Total Minutes    83755 - PT Manual Therapy Minutes  20  -SI         Manual Rx 1    Manual Rx 1 Location  left shoulder  -SI      Manual Rx 1 Type  oscillations and PROM  -SI      Manual Rx 1 Duration  20  -SI        User Key  (r) = Recorded By, (t) = Taken By, (c) = Cosigned By    Initials Name Provider Type    Madiha Lopezsivan HANSEN, LINDA Physical Therapy Assistant          PT OP Goals     Row Name 06/12/19 0800          PT Short Term Goals    STG 1  Patient will be independent with education for symptom management, joint protection and strategies to minimize stress on affected tissues  -SI     STG 1 Progress  Progressing memeory issues and Partner assists with HEP  -SI     STG 2  Pt to improve pain complaints to no more than 4/10 with ADLs  -SI     STG 2 Progress  Met  -SI     STG 3  Pt to improve shoulder PROM in flex=150 deg, abd=150 deg, ER=80 deg and IR=70 deg  -SI     STG 3 Progress  Met  -SI        Long Term Goals    LTG 1  Pt to improve pain complaints to no more than 2/10 with ADLs  -SI     LTG 1 Progress  Progressing  -SI     LTG 2  Pt to improve shoulder A/PROM in flex=115 deg/160 deg, abd=110/160 deg, ER=back of neck/85 deg and IR=L3/75 deg  -SI     LTG 2 Progress  Partially Met  -SI     LTG 3  Pt to improve shoulder strength to 4 to 4+/5  -SI     LTG 3 Progress  Progressing  -SI     LTG 4  Pt to improve shoulder AROM in flex=150 deg, abd=140 deg, ER=T2 and IR=L1  -SI     LTG 4 Progress  Progressing  -SI     LTG 5  Pt to improve DASH score from 43% to 15% for overall functional improvement  -SI     LTG 5 Progress  Progressing 31.6  -SI     LTG 6  Patient will be independent and compliant with advanced  home exercise program to facilitate self-management of symptoms.  -SI     LTG 6 Progress  New  -SI       User Key  (r) = Recorded By, (t) = Taken By, (c) = Cosigned By    Initials Name Provider Type    Mary Lou Lopez, PTA Physical Therapy Assistant          Therapy Education  Given: HEP, Symptoms/condition management(due to Pt's poor memory from old stroke she needs repeated instruction and her male partner helps her also)  Program: Progressed  How Provided: Verbal, Demonstration, Written  Provided to: Patient  Level of Understanding: Teach back education performed    Outcome Measure Options: Disabilities of the Arm, Shoulder, and Hand (DASH)(31.6%)         Time Calculation:   Start Time: 0800  Stop Time: 0845  Time Calculation (min): 45 min  Total Timed Code Minutes- PT: 45 minute(s)      PT G-Codes  Outcome Measure Options: Disabilities of the Arm, Shoulder, and Hand (DASH)(31.6%)         Nery Higgins, PT  6/12/2019

## 2019-06-26 ENCOUNTER — HOSPITAL ENCOUNTER (OUTPATIENT)
Dept: PHYSICAL THERAPY | Facility: HOSPITAL | Age: 64
Setting detail: THERAPIES SERIES
Discharge: HOME | End: 2019-06-26

## 2019-06-26 DIAGNOSIS — Z98.890 S/P LEFT ROTATOR CUFF REPAIR: ICD-10-CM

## 2019-06-26 DIAGNOSIS — M25.512 ACUTE PAIN OF LEFT SHOULDER: Primary | ICD-10-CM

## 2019-06-26 PROCEDURE — 97140 MANUAL THERAPY 1/> REGIONS: CPT

## 2019-06-26 PROCEDURE — 97110 THERAPEUTIC EXERCISES: CPT

## 2019-06-26 NOTE — THERAPY TREATMENT NOTE
Outpatient Physical Therapy Ortho Treatment Note  [facility]     Patient Name: Joanne Louie  : 1955  MRN: [id number]  Today's Date: 2019      Visit Date: 2019    Visit Dx:    ICD-10-CM ICD-9-CM   1. Acute pain of left shoulder M25.512 719.41   2. S/P left rotator cuff repair Z98.890 V45.89       Patient Active Problem List   Diagnosis   • Vasovagal near syncope   • Leukocytosis due to recent steroid injection   • Left shoulder pain   • Essential hypertension   • Aneurysm (CMS/HCC)   • Iron deficiency anemia   • Acute kidney injury (CMS/HCC)   • Esophagitis determined by endoscopy   • Gastritis determined by endoscopy   • Duodenitis without bleeding   • Hoyt's esophagus without dysplasia        Past Medical History:   Diagnosis Date   • Anemia    • Aneurysm (CMS/HCC)    • Arthritis    • Hoyt esophagus    • Esophagitis, [place] grade B    • Gastritis    • GERD (gastroesophageal reflux disease)    • Hiatal hernia    • History of cervical cancer    • Hyperlipidemia    • Hypertension    • Rotator cuff tear    • Stroke (CMS/HCC)         Past Surgical History:   Procedure Laterality Date   • ABDOMINAL SURGERY     • CERVIX REMOVAL     • COLONOSCOPY     • ENDOSCOPY N/A 3/13/2019    Z line regular, 35 cm from incisors, Large HH, LA Grade B reflux esophagitis, gastritis, duodenitis   • IR CEREBRAL ANEURYSM COILING     • SHOULDER ARTHROSCOPY W/ ROTATOR CUFF REPAIR Left 2019    Procedure: LEFT SHOULDER ARTHROSCOPY WITH ROTATOR CUFF REPAIR AND ACROMIOPLASTY and labral debridement;  Surgeon: Jb Graham MD;  Location: [facility] OR [facility];  Service: Orthopedics   • VASCULAR SURGERY         PT Ortho     Row Name 19 0800       Subjective Comments    Subjective Comments  Pt states she fell down a flight of steps in her home.  No injury to shoulder but multiple areas of soreness in particular her tail bone.  Fall was on 19.  RTMD yesterday and Sh ok and continue PT.  Pt wants tx  today and one more on 7-5-19, then DC PT due to financial situation.  -SI       Subjective Pain    Able to rate subjective pain?  yes  -SI    Pre-Treatment Pain Level  0  -SI    Post-Treatment Pain Level  0  -SI    Subjective Pain Comment  Pain at worst 4 left shoulder.  Early fatigue with the sh ex but not increase pain.  -SI       Left Upper Ext    Lt Shoulder Abduction AROM  152  -SI    Lt Shoulder Abduction PROM  165  -SI    Lt Shoulder Flexion AROM  152  -SI    Lt Shoulder Flexion PROM  165  -SI    Lt Shoulder External Rotation AROM  T2  -SI    Lt Shoulder External Rotation PROM  75  -SI    Lt Shoulder Internal Rotation AROM  T12  -SI    Lt Shoulder Internal Rotation PROM  80  -SI       MMT Left Upper Ext    Lt Shoulder Flexion MMT, Gross Movement  (4-/5) good minus  -SI    Lt Shoulder ABduction MMT, Gross Movement  (4-/5) good minus  -SI    Lt Shoulder Internal Rotation MMT, Gross Movement  (4-/5) good minus  -SI    Lt Shoulder External Rotation MMT, Gross Movement  (3+/5) fair plus  -SI      User Key  (r) = Recorded By, (t) = Taken By, (c) = Cosigned By    Initials Name Provider Type    Mary Lou Lopez PTA Physical Therapy Assistant                      PT Assessment/Plan     Row Name 06/26/19 0824          PT Assessment    Assessment Comments  Pt moving slowly with tail baone pain from fall a week ago.  No apparent injury to left shoulder.  ROM is good, ER is weak.  HEP for strength upgraded today and anticipate one more upgrade in a week.  -SI       User Key  (r) = Recorded By, (t) = Taken By, (c) = Cosigned By    Initials Name Provider Type    Mary Lou Lopez PTA Physical Therapy Assistant            Exercises     Row Name 06/26/19 0800 06/26/19 0700          Subjective Comments    Subjective Comments  Pt states she fell down a flight of steps in her home.  No injury to shoulder but multiple areas of soreness in particular her tail bone.  Fall was on 6-18-19.  RTMD yesterday and Sh ok and continue  PT.  Pt wants tx today and one more on 7-5-19, then DC PT due to financial situation.  -SI  --        Subjective Pain    Able to rate subjective pain?  yes  -SI  --     Pre-Treatment Pain Level  0  -SI  --     Post-Treatment Pain Level  0  -SI  --     Subjective Pain Comment  Pain at worst 4 left shoulder.  Early fatigue with the sh ex but not increase pain.  -SI  --        Total Minutes    54126 - PT Therapeutic Exercise Minutes  30  -SI  --     05106 - PT Manual Therapy Minutes  --  15  -SI        Exercise 1    Exercise Name 1  shoulder rolls and scap ret  -SI  --     Sets 1  1  -SI  --     Reps 1  10  -SI  --        Exercise 2    Exercise Name 2  UBE  -SI  --     Sets 2     -SI  --     Reps 2     -SI  --     Time 2   4 min  -SI  --        Exercise 3    Exercise Name 3  biceps curls 2 lbs  -SI  --     Sets 3  2  -SI  --     Reps 3  10  -SI  --        Exercise 4    Exercise Name 4  AAROM ER w/cane  -SI  --     Sets 4  1  -SI  --     Reps 4  10  -SI  --     Time 4  10 sec  -SI  --        Exercise 6    Exercise Name 6  supine AA flex with cane  -SI  --     Sets 6  1  -SI  --     Reps 6  10  -SI  --     Time 6  10  -SI  --        Exercise 7    Time 7     -SI  --        Exercise 8    Exercise Name 8  supine D2 flex and horiz abd  -SI  --     Sets 8  2  -SI  --     Reps 8  10  -SI  --     Time 8     -SI  --     Additional Comments  yelow  -SI  --        Exercise 9    Exercise Name 9  ER side 2 lb  -SI  --     Sets 9  3  -SI  --     Reps 9  10  -SI  --        Exercise 10    Exercise Name 10  scapular retraction and Sh ext with green TB  -SI  --     Sets 10  2  -SI  --     Reps 10  10  -SI  --        Exercise 11    Exercise Name 11  IR and ER with yellow TB  -SI  --     Cueing 11  Verbal;Demo  -SI  --     Reps 11  2  -SI  --     Time 11  10  -SI  --       User Key  (r) = Recorded By, (t) = Taken By, (c) = Cosigned By    Initials Name Provider Type    SI Mary Lou Matamoros, PTA Physical Therapy Assistant                       Manual Rx (last 36 hours)      Manual Treatments     Row Name 06/26/19 0700             Total Minutes    62007 - PT Manual Therapy Minutes  15  -SI         Manual Rx 1    Manual Rx 1 Location  left shoulder  -SI      Manual Rx 1 Type  oscillations and PROM  -SI      Manual Rx 1 Duration  15  -SI        User Key  (r) = Recorded By, (t) = Taken By, (c) = Cosigned By    Initials Name Provider Type    Mary Lou Lopez PTA Physical Therapy Assistant              Therapy Education  Given: HEP, Symptoms/condition management  Program: Progressed  How Provided: Verbal, Demonstration, Written  Provided to: Patient, Other (comment)(male friend that helps her with HEP due to her memory issues.)              Time Calculation:   Start Time: 0800  Stop Time: 0845  Time Calculation (min): 45 min  Total Timed Code Minutes- PT: 45 minute(s)  Therapy Charges for Today     Code Description Service Date Service Provider Modifiers Qty    14699409817 HC PT THER PROC EA 15 MIN 6/26/2019 Mary Lou Matamoros PTA GP 2    64103155014 HC PT MANUAL THERAPY EA 15 MIN 6/26/2019 Mary Lou aMtamoros PTA GP 1                    Mary Lou Matamoros PTA  6/26/2019

## 2019-07-05 ENCOUNTER — HOSPITAL ENCOUNTER (OUTPATIENT)
Dept: PHYSICAL THERAPY | Facility: HOSPITAL | Age: 64
Setting detail: THERAPIES SERIES
Discharge: HOME | End: 2019-07-05

## 2019-07-05 DIAGNOSIS — Z98.890 S/P LEFT ROTATOR CUFF REPAIR: ICD-10-CM

## 2019-07-05 DIAGNOSIS — M25.512 ACUTE PAIN OF LEFT SHOULDER: Primary | ICD-10-CM

## 2019-07-05 PROCEDURE — 97110 THERAPEUTIC EXERCISES: CPT

## 2019-07-05 PROCEDURE — 97140 MANUAL THERAPY 1/> REGIONS: CPT

## 2019-07-05 NOTE — THERAPY DISCHARGE NOTE
Outpatient Physical Therapy Ortho Treatment Note/Discharge Summary  [facility]     Patient Name: Joanne Louie  : 1955  MRN: [id number]  Today's Date: 2019      Visit Date: 2019    Visit Dx:    ICD-10-CM ICD-9-CM   1. Acute pain of left shoulder M25.512 719.41   2. S/P left rotator cuff repair Z98.890 V45.89       Patient Active Problem List   Diagnosis   • Vasovagal near syncope   • Leukocytosis due to recent steroid injection   • Left shoulder pain   • Essential hypertension   • Aneurysm (CMS/HCC)   • Iron deficiency anemia   • Acute kidney injury (CMS/HCC)   • Esophagitis determined by endoscopy   • Gastritis determined by endoscopy   • Duodenitis without bleeding   • Hoyt's esophagus without dysplasia        Past Medical History:   Diagnosis Date   • Anemia    • Aneurysm (CMS/HCC)    • Arthritis    • Hoyt esophagus    • Esophagitis, [place] grade B    • Gastritis    • GERD (gastroesophageal reflux disease)    • Hiatal hernia    • History of cervical cancer    • Hyperlipidemia    • Hypertension    • Rotator cuff tear    • Stroke (CMS/HCC)         Past Surgical History:   Procedure Laterality Date   • ABDOMINAL SURGERY     • CERVIX REMOVAL     • COLONOSCOPY     • ENDOSCOPY N/A 3/13/2019    Z line regular, 35 cm from incisors, Large HH, LA Grade B reflux esophagitis, gastritis, duodenitis   • IR CEREBRAL ANEURYSM COILING     • SHOULDER ARTHROSCOPY W/ ROTATOR CUFF REPAIR Left 2019    Procedure: LEFT SHOULDER ARTHROSCOPY WITH ROTATOR CUFF REPAIR AND ACROMIOPLASTY and labral debridement;  Surgeon: Jb Graham MD;  Location: [facility] [place];  Service: Orthopedics   • VASCULAR SURGERY         PT Ortho     Row Name 19 0800       Subjective Comments    Subjective Comments  Pain in sacrum etc all getting better since fall.  -SI       Subjective Pain    Able to rate subjective pain?  no  -SI    Pre-Treatment Pain Level  0  -SI    Post-Treatment Pain Level  0  -SI     Subjective Pain Comment  pain left shoulder 2 at worst.  -SI       Left Upper Ext    Lt Shoulder Abduction PROM  170  -SI    Lt Shoulder Flexion PROM  170  -SI    Lt Shoulder External Rotation PROM  78  -SI    Lt Shoulder Internal Rotation PROM  80  -SI      User Key  (r) = Recorded By, (t) = Taken By, (c) = Cosigned By    Initials Name Provider Type    Madiha Lopezsivan HANSEN, PTA Physical Therapy Assistant                      PT Assessment/Plan     Row Name 07/05/19 0906          PT Assessment    Assessment Comments  Pt with chronic memory issues due to medical hx stated.  Ind with HEP as long ass has sheets and hermale partner to supervise.  ER still weak at 4/5. Pt wants today to be last tx session due to financial reasons.  She is 10.5 weeks post-op..    -SI       User Key  (r) = Recorded By, (t) = Taken By, (c) = Cosigned By    Initials Name Provider Type    Madiha Lopezsivan HANSEN, PTA Physical Therapy Assistant              Exercises     Row Name 07/05/19 0800 07/05/19 0700          Subjective Comments    Subjective Comments  Pain in sacrum etc all getting better since fall.  -SI  --        Subjective Pain    Able to rate subjective pain?  no  -SI  --     Pre-Treatment Pain Level  0  -SI  --     Post-Treatment Pain Level  0  -SI  --     Subjective Pain Comment  pain left shoulder 2 at worst.  -SI  --        Total Minutes    02141 - PT Therapeutic Exercise Minutes  30  -SI  --     96127 - PT Manual Therapy Minutes  --  15  -SI        Exercise 1    Exercise Name 1  shoulder rolls and scap ret  -SI  --     Sets 1  1  -SI  --     Reps 1  10  -SI  --        Exercise 2    Exercise Name 2  UBE  -SI  --     Sets 2     -SI  --     Reps 2     -SI  --     Time 2   4 min  -SI  --        Exercise 3    Exercise Name 3  biceps curls 2 lbs  -SI  --     Sets 3  2  -SI  --     Reps 3  10  -SI  --        Exercise 4    Exercise Name 4  AAROM ER w/cane  -SI  --     Sets 4  1  -SI  --     Reps 4  10  -SI  --     Time 4  10 sec  -SI  --         Exercise 6    Exercise Name 6  supine AA flex with cane  -SI  --     Sets 6  1  -SI  --     Reps 6  10  -SI  --     Time 6  10  -SI  --        Exercise 7    Time 7     -SI  --        Exercise 8    Exercise Name 8  supine D2 flex and horiz abd  -SI  --     Sets 8  2  -SI  --     Reps 8  10  -SI  --     Time 8     -SI  --     Additional Comments  red  -SI  --        Exercise 9    Exercise Name 9  ER side 2 lb  -SI  --     Sets 9  3  -SI  --     Reps 9  10  -SI  --        Exercise 10    Exercise Name 10  scapular retraction and Sh ext with green TB  -SI  --     Sets 10  2  -SI  --     Reps 10  10  -SI  --        Exercise 11    Exercise Name 11  IR and ER with yellow TB  -SI  --     Cueing 11  Verbal;Demo  -SI  --     Reps 11  2  -SI  --     Time 11  10  -SI  --       User Key  (r) = Recorded By, (t) = Taken By, (c) = Cosigned By    Initials Name Provider Type    Mary Lou Lopez PTA Physical Therapy Assistant                        Manual Rx (last 36 hours)      Manual Treatments     Row Name 07/05/19 0700             Total Minutes    95993 - PT Manual Therapy Minutes  15  -SI         Manual Rx 1    Manual Rx 1 Location  left shoulder  -SI      Manual Rx 1 Type  oscillations and PROM  -SI      Manual Rx 1 Duration  15  -SI        User Key  (r) = Recorded By, (t) = Taken By, (c) = Cosigned By    Initials Name Provider Type    Mary Lou Lopez PTA Physical Therapy Assistant          PT OP Goals     Row Name 07/05/19 0800          PT Short Term Goals    STG 1  Patient will be independent with education for symptom management, joint protection and strategies to minimize stress on affected tissues  -SI     STG 1 Progress  Met memeory issues and Partner assists with HEP  -SI     STG 2  Pt to improve pain complaints to no more than 4/10 with ADLs  -SI     STG 2 Progress  Met  -SI     STG 3  Pt to improve shoulder PROM in flex=150 deg, abd=150 deg, ER=80 deg and IR=70 deg  -SI     STG 3 Progress  Met  -SI         Long Term Goals    LTG 1  Pt to improve pain complaints to no more than 2/10 with ADLs  -SI     LTG 1 Progress  Met  -SI     LTG 2  Pt to improve shoulder A/PROM in flex=115 deg/160 deg, abd=110/160 deg, ER=back of neck/85 deg and IR=L3/75 deg  -SI     LTG 2 Progress  Met  -SI     LTG 3  Pt to improve shoulder strength to 4 to 4+/5  -SI     LTG 3 Progress  Met ER 4/5, otthers 4+/5  -SI     LTG 4  Pt to improve shoulder AROM in flex=150 deg, abd=140 deg, ER=T2 and IR=L1  -SI     LTG 4 Progress  Met  -SI     LTG 5  Pt to improve DASH score from 43% to 15% for overall functional improvement  -SI     LTG 5 Progress  Met 11.6%  -SI     LTG 6  Patient will be independent and compliant with advanced home exercise program to facilitate self-management of symptoms.  -SI     LTG 6 Progress  Met met as long as has ex sheets and partner to supervise  -SI       User Key  (r) = Recorded By, (t) = Taken By, (c) = Cosigned By    Initials Name Provider Type    Mary Lou Lopez PTA Physical Therapy Assistant          Therapy Education  Given: HEP, Symptoms/condition management, Posture/body mechanics(strength every other day, stretch daily)  Program: Progressed  How Provided: Verbal, Demonstration, Written  Provided to: Patient  Level of Understanding: Teach back education performed    Outcome Measure Options: Disabilities of the Arm, Shoulder, and Hand (DASH)(11.6%)         Time Calculation:   Start Time: 0830  Stop Time: 0915  Time Calculation (min): 45 min  Total Timed Code Minutes- PT: 45 minute(s)  Therapy Charges for Today     Code Description Service Date Service Provider Modifiers Qty    05627694684 HC PT THER PROC EA 15 MIN 7/5/2019 Mary Lou Matamoros PTA GP 2    68910605641 HC PT MANUAL THERAPY EA 15 MIN 7/5/2019 Mary Lou Matamoros PTA GP 1          PT G-Codes  Outcome Measure Options: Disabilities of the Arm, Shoulder, and Hand (DASH)(11.6%)     OP PT Discharge Summary  Date of Discharge: 07/05/19  Reason for Discharge: All  goals achieved  Outcomes Achieved: Able to achieve all goals within established timeline  Discharge Destination: Home with home program  Discharge Instructions/Additional Comments: HEP as instructed  Cautioned on doing heavy chores or overhead activity as her tendency is to do too much      Mary Lou Matamoros, PTA  7/5/2019

## 2020-02-07 ENCOUNTER — DOCUMENTATION (OUTPATIENT)
Dept: PHYSICAL THERAPY | Facility: CLINIC | Age: 65
End: 2020-02-07

## 2020-02-07 DIAGNOSIS — M25.512 ACUTE PAIN OF LEFT SHOULDER: Primary | ICD-10-CM

## 2020-02-07 DIAGNOSIS — Z98.890 S/P LEFT ROTATOR CUFF REPAIR: ICD-10-CM

## 2025-08-18 ENCOUNTER — TELEPHONE (OUTPATIENT)
Dept: NEUROSURGERY | Facility: CLINIC | Age: 70
End: 2025-08-18
Payer: MEDICARE

## 2025-08-29 ENCOUNTER — OFFICE VISIT (OUTPATIENT)
Dept: NEUROSURGERY | Facility: CLINIC | Age: 70
End: 2025-08-29
Payer: MEDICARE

## 2025-08-29 VITALS
DIASTOLIC BLOOD PRESSURE: 66 MMHG | HEART RATE: 60 BPM | SYSTOLIC BLOOD PRESSURE: 118 MMHG | WEIGHT: 173.5 LBS | HEIGHT: 63 IN | TEMPERATURE: 96.8 F | OXYGEN SATURATION: 97 % | BODY MASS INDEX: 30.74 KG/M2

## 2025-08-29 DIAGNOSIS — I67.1 BRAIN ANEURYSM: Primary | ICD-10-CM

## 2025-08-29 PROCEDURE — 3074F SYST BP LT 130 MM HG: CPT | Performed by: NEUROLOGICAL SURGERY

## 2025-08-29 PROCEDURE — 1159F MED LIST DOCD IN RCRD: CPT | Performed by: NEUROLOGICAL SURGERY

## 2025-08-29 PROCEDURE — 99204 OFFICE O/P NEW MOD 45 MIN: CPT | Performed by: NEUROLOGICAL SURGERY

## 2025-08-29 PROCEDURE — 3078F DIAST BP <80 MM HG: CPT | Performed by: NEUROLOGICAL SURGERY

## 2025-08-29 PROCEDURE — 1160F RVW MEDS BY RX/DR IN RCRD: CPT | Performed by: NEUROLOGICAL SURGERY

## (undated) DEVICE — PK ARTHSCP SHLDR TOWER 40

## (undated) DEVICE — TUBING, SUCTION, 1/4" X 10', STRAIGHT: Brand: MEDLINE

## (undated) DEVICE — 4.0 CM ACROMIOBLASTER STRAIGHT                                    BURRS, POWER/EP-1, SAGE GREEN, 8000                                    MAXIMUM RPM, PACKAGED 6 PER BOX, STERILE

## (undated) DEVICE — CLEAR-TRAC 7.0 MM X 72 MM THREADED                                    CANNULA, WITH DISPOSABLE OBTURATOR,                                    GREY, STERILE: Brand: CLEAR-TRAC

## (undated) DEVICE — TBG ARTHSCP PUMP W CONN/REDUC 8FT

## (undated) DEVICE — TBG ARTHSCP PT W CONN/REDUC 8FT

## (undated) DEVICE — TP NDL SCORPION MULTIFIRE

## (undated) DEVICE — Device: Brand: DEFENDO AIR/WATER/SUCTION AND BIOPSY VALVE

## (undated) DEVICE — DRSNG GZ PETROLTM XEROFORM CURAD 1X8IN STRL

## (undated) DEVICE — ABL ASP APOLLO RF XL 90D

## (undated) DEVICE — SUT PROLN 3/0 FS2 18IN 8665G

## (undated) DEVICE — GLV SURG SENSICARE MICRO PF LF 7.5 STRL

## (undated) DEVICE — GLV SURG TRIUMPH CLASSIC PF LTX 8 STRL

## (undated) DEVICE — BIT DRL JUGGERKNOT 2.9MM

## (undated) DEVICE — IMMOB SHLDR PAD2 UNIV SM

## (undated) DEVICE — 4.5 MM FULL RADIUS STRAIGHT                                    BLADES, POWER/EP-1, YELLOW, PACKAGED                                    6 PER BOX, STERILE: Brand: DYONICS

## (undated) DEVICE — CANNULA,ADULT,SOFT-TOUCH,7'TUBE,UC: Brand: PENDING

## (undated) DEVICE — SKIN PREP TRAY W/CHG: Brand: MEDLINE INDUSTRIES, INC.

## (undated) DEVICE — CANNULA THREADED DISP 8.5 X 72MM: Brand: CLEAR-TRAC

## (undated) DEVICE — FRCP BX RADJAW4 NDL 2.8 240CM LG OG BX40

## (undated) DEVICE — BITEBLOCK OMNI BLOC

## (undated) DEVICE — GLV SURG SENSICARE MICRO PF LF 6.5 STRL